# Patient Record
Sex: FEMALE | Race: WHITE | NOT HISPANIC OR LATINO | Employment: OTHER | URBAN - METROPOLITAN AREA
[De-identification: names, ages, dates, MRNs, and addresses within clinical notes are randomized per-mention and may not be internally consistent; named-entity substitution may affect disease eponyms.]

---

## 2023-09-06 PROBLEM — M10.9 GOUT: Status: ACTIVE | Noted: 2023-09-06

## 2023-09-06 PROBLEM — R55 NEAR SYNCOPE: Status: ACTIVE | Noted: 2023-09-06

## 2023-09-06 PROBLEM — Z86.711 HISTORY OF PULMONARY EMBOLISM: Status: ACTIVE | Noted: 2023-09-06

## 2023-09-06 PROBLEM — F32.9 MAJOR DEPRESSIVE DISORDER, SINGLE EPISODE, UNSPECIFIED: Status: ACTIVE | Noted: 2023-09-06

## 2023-09-06 PROBLEM — S72.143A INTERTROCHANTERIC FRACTURE (MULTI): Status: ACTIVE | Noted: 2023-09-06

## 2023-09-06 PROBLEM — M19.90 OSTEOARTHRITIS: Status: ACTIVE | Noted: 2023-09-06

## 2023-09-06 PROBLEM — R07.9 CHEST PAIN: Status: ACTIVE | Noted: 2023-09-06

## 2023-09-06 PROBLEM — D75.839 THROMBOCYTOSIS: Status: ACTIVE | Noted: 2023-09-06

## 2023-09-06 PROBLEM — R00.2 PALPITATIONS: Status: ACTIVE | Noted: 2023-09-06

## 2023-09-06 PROBLEM — R06.00 DYSPNEA: Status: ACTIVE | Noted: 2023-09-06

## 2023-09-06 PROBLEM — I10 HYPERTENSION: Status: ACTIVE | Noted: 2023-09-06

## 2023-09-06 PROBLEM — I82.90 VENOUS THROMBOSIS: Status: ACTIVE | Noted: 2023-09-06

## 2023-09-06 PROBLEM — E03.9 HYPOTHYROIDISM: Status: ACTIVE | Noted: 2023-09-06

## 2023-09-06 PROBLEM — S39.012A BACK STRAIN: Status: ACTIVE | Noted: 2023-09-06
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
## 2023-09-06 PROBLEM — R55 SYNCOPE AND COLLAPSE: Status: ACTIVE | Noted: 2023-09-06

## 2023-09-06 PROBLEM — E78.5 HYPERLIPIDEMIA: Status: ACTIVE | Noted: 2023-09-06

## 2023-09-06 PROBLEM — E55.9 VITAMIN D DEFICIENCY: Status: ACTIVE | Noted: 2023-09-06

## 2023-09-06 PROBLEM — R10.9 ABDOMINAL PAIN: Status: ACTIVE | Noted: 2023-09-06

## 2023-09-06 RX ORDER — LEVOTHYROXINE SODIUM 88 UG/1
88 TABLET ORAL
COMMUNITY
Start: 2017-08-08 | End: 2023-10-11 | Stop reason: HOSPADM

## 2023-09-06 RX ORDER — LISINOPRIL 40 MG/1
40 TABLET ORAL DAILY
COMMUNITY
Start: 2018-04-02 | End: 2023-10-11 | Stop reason: HOSPADM

## 2023-09-06 RX ORDER — METRONIDAZOLE 500 MG/1
500 TABLET ORAL EVERY 8 HOURS
COMMUNITY
End: 2023-10-11 | Stop reason: HOSPADM

## 2023-09-06 RX ORDER — BUPROPION HYDROCHLORIDE 150 MG/1
150 TABLET ORAL EVERY MORNING
COMMUNITY
Start: 2023-01-26 | End: 2023-10-20

## 2023-09-06 RX ORDER — LEVOTHYROXINE SODIUM 112 UG/1
112 TABLET ORAL DAILY
COMMUNITY
End: 2023-10-20

## 2023-09-06 RX ORDER — ALLOPURINOL 100 MG/1
100 TABLET ORAL DAILY
COMMUNITY
Start: 2022-11-14 | End: 2023-10-20 | Stop reason: SDUPTHER

## 2023-09-06 RX ORDER — L. ACIDOPHILUS/L.BULGARICUS 1MM CELL
1 TABLET ORAL
COMMUNITY
End: 2023-10-20

## 2023-09-06 RX ORDER — PRAVASTATIN SODIUM 20 MG/1
20 TABLET ORAL DAILY
COMMUNITY
Start: 2018-12-19 | End: 2023-10-20 | Stop reason: SDUPTHER

## 2023-09-06 RX ORDER — LISINOPRIL 10 MG/1
10 TABLET ORAL DAILY
COMMUNITY
End: 2023-10-13 | Stop reason: SDUPTHER

## 2023-10-09 ENCOUNTER — HOSPITAL ENCOUNTER (OUTPATIENT)
Facility: HOSPITAL | Age: 83
Setting detail: OBSERVATION
LOS: 1 days | Discharge: HOME | DRG: 392 | End: 2023-10-11
Attending: EMERGENCY MEDICINE | Admitting: INTERNAL MEDICINE
Payer: MEDICARE

## 2023-10-09 ENCOUNTER — APPOINTMENT (OUTPATIENT)
Dept: RADIOLOGY | Facility: HOSPITAL | Age: 83
DRG: 392 | End: 2023-10-09
Payer: MEDICARE

## 2023-10-09 DIAGNOSIS — R55 SYNCOPE AND COLLAPSE: ICD-10-CM

## 2023-10-09 DIAGNOSIS — K57.92 DIVERTICULITIS: Primary | ICD-10-CM

## 2023-10-09 LAB
ALBUMIN SERPL-MCNC: 3.8 G/DL (ref 3.5–5)
ALP BLD-CCNC: 94 U/L (ref 35–125)
ALT SERPL-CCNC: 20 U/L (ref 5–40)
ANION GAP SERPL CALC-SCNC: 13 MMOL/L
APPEARANCE UR: ABNORMAL
AST SERPL-CCNC: 19 U/L (ref 5–40)
BACTERIA #/AREA URNS AUTO: ABNORMAL /HPF
BASOPHILS # BLD AUTO: 0.11 X10*3/UL (ref 0–0.1)
BASOPHILS NFR BLD AUTO: 0.5 %
BILIRUB SERPL-MCNC: 0.5 MG/DL (ref 0.1–1.2)
BILIRUB UR STRIP.AUTO-MCNC: NEGATIVE MG/DL
BUN SERPL-MCNC: 20 MG/DL (ref 8–25)
CALCIUM SERPL-MCNC: 9.6 MG/DL (ref 8.5–10.4)
CHLORIDE SERPL-SCNC: 104 MMOL/L (ref 97–107)
CO2 SERPL-SCNC: 22 MMOL/L (ref 24–31)
COLOR UR: YELLOW
CREAT SERPL-MCNC: 1.3 MG/DL (ref 0.4–1.6)
EOSINOPHIL # BLD AUTO: 0.23 X10*3/UL (ref 0–0.4)
EOSINOPHIL NFR BLD AUTO: 1 %
ERYTHROCYTE [DISTWIDTH] IN BLOOD BY AUTOMATED COUNT: 13.2 % (ref 11.5–14.5)
GFR SERPL CREATININE-BSD FRML MDRD: 41 ML/MIN/1.73M*2
GLUCOSE SERPL-MCNC: 170 MG/DL (ref 65–99)
GLUCOSE UR STRIP.AUTO-MCNC: NORMAL MG/DL
HCT VFR BLD AUTO: 47.9 % (ref 36–46)
HGB BLD-MCNC: 15.5 G/DL (ref 12–16)
HYALINE CASTS #/AREA URNS AUTO: ABNORMAL /LPF
IMM GRANULOCYTES # BLD AUTO: 0.13 X10*3/UL (ref 0–0.5)
IMM GRANULOCYTES NFR BLD AUTO: 0.6 % (ref 0–0.9)
KETONES UR STRIP.AUTO-MCNC: NEGATIVE MG/DL
LACTATE BLDV-SCNC: 0.9 MMOL/L (ref 0.4–2)
LACTATE BLDV-SCNC: 2.1 MMOL/L (ref 0.4–2)
LEUKOCYTE ESTERASE UR QL STRIP.AUTO: ABNORMAL
LIPASE SERPL-CCNC: 35 U/L (ref 16–63)
LYMPHOCYTES # BLD AUTO: 1.37 X10*3/UL (ref 0.8–3)
LYMPHOCYTES NFR BLD AUTO: 6.2 %
MCH RBC QN AUTO: 28.9 PG (ref 26–34)
MCHC RBC AUTO-ENTMCNC: 32.4 G/DL (ref 32–36)
MCV RBC AUTO: 89 FL (ref 80–100)
MONOCYTES # BLD AUTO: 0.83 X10*3/UL (ref 0.05–0.8)
MONOCYTES NFR BLD AUTO: 3.8 %
NEUTROPHILS # BLD AUTO: 19.26 X10*3/UL (ref 1.6–5.5)
NEUTROPHILS NFR BLD AUTO: 87.9 %
NITRITE UR QL STRIP.AUTO: NEGATIVE
NRBC BLD-RTO: 0 /100 WBCS (ref 0–0)
PH UR STRIP.AUTO: 5.5 [PH]
PLATELET # BLD AUTO: 442 X10*3/UL (ref 150–450)
PMV BLD AUTO: 10.5 FL (ref 7.5–11.5)
POTASSIUM SERPL-SCNC: 4.8 MMOL/L (ref 3.4–5.1)
PROT SERPL-MCNC: 7 G/DL (ref 5.9–7.9)
PROT UR STRIP.AUTO-MCNC: ABNORMAL MG/DL
RBC # BLD AUTO: 5.37 X10*6/UL (ref 4–5.2)
RBC # UR STRIP.AUTO: ABNORMAL /UL
RBC #/AREA URNS AUTO: ABNORMAL /HPF
SARS-COV-2 RNA RESP QL NAA+PROBE: NOT DETECTED
SODIUM SERPL-SCNC: 139 MMOL/L (ref 133–145)
SP GR UR STRIP.AUTO: 1.03
SQUAMOUS #/AREA URNS AUTO: ABNORMAL /HPF
UROBILINOGEN UR STRIP.AUTO-MCNC: NORMAL MG/DL
WBC # BLD AUTO: 21.9 X10*3/UL (ref 4.4–11.3)
WBC #/AREA URNS AUTO: ABNORMAL /HPF

## 2023-10-09 PROCEDURE — 99285 EMERGENCY DEPT VISIT HI MDM: CPT | Performed by: EMERGENCY MEDICINE

## 2023-10-09 PROCEDURE — 87075 CULTR BACTERIA EXCEPT BLOOD: CPT | Mod: CMCLAB,WESLAB,59 | Performed by: EMERGENCY MEDICINE

## 2023-10-09 PROCEDURE — 87635 SARS-COV-2 COVID-19 AMP PRB: CPT | Performed by: EMERGENCY MEDICINE

## 2023-10-09 PROCEDURE — 85025 COMPLETE CBC W/AUTO DIFF WBC: CPT | Performed by: EMERGENCY MEDICINE

## 2023-10-09 PROCEDURE — 83605 ASSAY OF LACTIC ACID: CPT | Performed by: EMERGENCY MEDICINE

## 2023-10-09 PROCEDURE — 2500000004 HC RX 250 GENERAL PHARMACY W/ HCPCS (ALT 636 FOR OP/ED): Performed by: STUDENT IN AN ORGANIZED HEALTH CARE EDUCATION/TRAINING PROGRAM

## 2023-10-09 PROCEDURE — 1100000001 HC PRIVATE ROOM DAILY

## 2023-10-09 PROCEDURE — 97530 THERAPEUTIC ACTIVITIES: CPT | Mod: GO

## 2023-10-09 PROCEDURE — 80053 COMPREHEN METABOLIC PANEL: CPT | Performed by: EMERGENCY MEDICINE

## 2023-10-09 PROCEDURE — 2500000004 HC RX 250 GENERAL PHARMACY W/ HCPCS (ALT 636 FOR OP/ED): Performed by: INTERNAL MEDICINE

## 2023-10-09 PROCEDURE — 36415 COLL VENOUS BLD VENIPUNCTURE: CPT | Performed by: EMERGENCY MEDICINE

## 2023-10-09 PROCEDURE — 2500000004 HC RX 250 GENERAL PHARMACY W/ HCPCS (ALT 636 FOR OP/ED): Performed by: EMERGENCY MEDICINE

## 2023-10-09 PROCEDURE — 87075 CULTR BACTERIA EXCEPT BLOOD: CPT | Mod: CMCLAB,WESLAB | Performed by: EMERGENCY MEDICINE

## 2023-10-09 PROCEDURE — G0378 HOSPITAL OBSERVATION PER HR: HCPCS

## 2023-10-09 PROCEDURE — 87086 URINE CULTURE/COLONY COUNT: CPT | Mod: WESLAB | Performed by: EMERGENCY MEDICINE

## 2023-10-09 PROCEDURE — 93010 ELECTROCARDIOGRAM REPORT: CPT | Performed by: INTERNAL MEDICINE

## 2023-10-09 PROCEDURE — G1004 CDSM NDSC: HCPCS

## 2023-10-09 PROCEDURE — 83690 ASSAY OF LIPASE: CPT | Performed by: EMERGENCY MEDICINE

## 2023-10-09 PROCEDURE — 97165 OT EVAL LOW COMPLEX 30 MIN: CPT | Mod: GO

## 2023-10-09 PROCEDURE — 2500000001 HC RX 250 WO HCPCS SELF ADMINISTERED DRUGS (ALT 637 FOR MEDICARE OP): Performed by: INTERNAL MEDICINE

## 2023-10-09 PROCEDURE — 87086 URINE CULTURE/COLONY COUNT: CPT | Mod: CMCLAB,WESLAB | Performed by: EMERGENCY MEDICINE

## 2023-10-09 PROCEDURE — 81001 URINALYSIS AUTO W/SCOPE: CPT | Performed by: EMERGENCY MEDICINE

## 2023-10-09 RX ORDER — SODIUM CHLORIDE 9 MG/ML
100 INJECTION, SOLUTION INTRAVENOUS CONTINUOUS
Status: DISCONTINUED | OUTPATIENT
Start: 2023-10-09 | End: 2023-10-10

## 2023-10-09 RX ORDER — ONDANSETRON 4 MG/1
4 TABLET, ORALLY DISINTEGRATING ORAL EVERY 8 HOURS PRN
Status: DISCONTINUED | OUTPATIENT
Start: 2023-10-09 | End: 2023-10-11 | Stop reason: HOSPADM

## 2023-10-09 RX ORDER — ACETAMINOPHEN 160 MG/5ML
650 SOLUTION ORAL EVERY 4 HOURS PRN
Status: DISCONTINUED | OUTPATIENT
Start: 2023-10-09 | End: 2023-10-11 | Stop reason: HOSPADM

## 2023-10-09 RX ORDER — PANTOPRAZOLE SODIUM 40 MG/10ML
40 INJECTION, POWDER, LYOPHILIZED, FOR SOLUTION INTRAVENOUS
Status: DISCONTINUED | OUTPATIENT
Start: 2023-10-09 | End: 2023-10-11 | Stop reason: HOSPADM

## 2023-10-09 RX ORDER — FAMOTIDINE 10 MG/ML
20 INJECTION INTRAVENOUS ONCE
Status: COMPLETED | OUTPATIENT
Start: 2023-10-09 | End: 2023-10-09

## 2023-10-09 RX ORDER — BUPROPION HYDROCHLORIDE 150 MG/1
150 TABLET ORAL EVERY MORNING
Status: DISCONTINUED | OUTPATIENT
Start: 2023-10-09 | End: 2023-10-11 | Stop reason: HOSPADM

## 2023-10-09 RX ORDER — ONDANSETRON HYDROCHLORIDE 2 MG/ML
4 INJECTION, SOLUTION INTRAVENOUS EVERY 8 HOURS PRN
Status: DISCONTINUED | OUTPATIENT
Start: 2023-10-09 | End: 2023-10-11 | Stop reason: HOSPADM

## 2023-10-09 RX ORDER — ALLOPURINOL 100 MG/1
100 TABLET ORAL DAILY
Status: DISCONTINUED | OUTPATIENT
Start: 2023-10-09 | End: 2023-10-11 | Stop reason: HOSPADM

## 2023-10-09 RX ORDER — ENOXAPARIN SODIUM 100 MG/ML
40 INJECTION SUBCUTANEOUS EVERY 24 HOURS
Status: DISCONTINUED | OUTPATIENT
Start: 2023-10-09 | End: 2023-10-11 | Stop reason: HOSPADM

## 2023-10-09 RX ORDER — LEVOTHYROXINE SODIUM 112 UG/1
112 TABLET ORAL DAILY
Status: DISCONTINUED | OUTPATIENT
Start: 2023-10-09 | End: 2023-10-11 | Stop reason: HOSPADM

## 2023-10-09 RX ORDER — ACETAMINOPHEN 500 MG
5 TABLET ORAL NIGHTLY PRN
Status: DISCONTINUED | OUTPATIENT
Start: 2023-10-09 | End: 2023-10-11 | Stop reason: HOSPADM

## 2023-10-09 RX ORDER — ONDANSETRON HYDROCHLORIDE 2 MG/ML
4 INJECTION, SOLUTION INTRAVENOUS ONCE
Status: COMPLETED | OUTPATIENT
Start: 2023-10-09 | End: 2023-10-09

## 2023-10-09 RX ORDER — L. ACIDOPHILUS/L.BULGARICUS 1MM CELL
1 TABLET ORAL
Status: DISCONTINUED | OUTPATIENT
Start: 2023-10-09 | End: 2023-10-11 | Stop reason: HOSPADM

## 2023-10-09 RX ORDER — ACETAMINOPHEN 325 MG/1
650 TABLET ORAL EVERY 4 HOURS PRN
Status: DISCONTINUED | OUTPATIENT
Start: 2023-10-09 | End: 2023-10-11 | Stop reason: HOSPADM

## 2023-10-09 RX ORDER — ACETAMINOPHEN 650 MG/1
650 SUPPOSITORY RECTAL EVERY 4 HOURS PRN
Status: DISCONTINUED | OUTPATIENT
Start: 2023-10-09 | End: 2023-10-11 | Stop reason: HOSPADM

## 2023-10-09 RX ORDER — KETOROLAC TROMETHAMINE 30 MG/ML
15 INJECTION, SOLUTION INTRAMUSCULAR; INTRAVENOUS ONCE
Status: COMPLETED | OUTPATIENT
Start: 2023-10-09 | End: 2023-10-09

## 2023-10-09 RX ORDER — ACETAMINOPHEN 325 MG/1
650 TABLET ORAL ONCE
Status: DISCONTINUED | OUTPATIENT
Start: 2023-10-09 | End: 2023-10-11 | Stop reason: HOSPADM

## 2023-10-09 RX ORDER — PANTOPRAZOLE SODIUM 40 MG/1
40 TABLET, DELAYED RELEASE ORAL
Status: DISCONTINUED | OUTPATIENT
Start: 2023-10-09 | End: 2023-10-11 | Stop reason: HOSPADM

## 2023-10-09 RX ADMIN — SODIUM CHLORIDE 100 ML/HR: 900 INJECTION, SOLUTION INTRAVENOUS at 09:38

## 2023-10-09 RX ADMIN — SODIUM CHLORIDE 500 ML: 900 INJECTION, SOLUTION INTRAVENOUS at 07:59

## 2023-10-09 RX ADMIN — SODIUM CHLORIDE 1000 ML: 900 INJECTION, SOLUTION INTRAVENOUS at 05:39

## 2023-10-09 RX ADMIN — ENOXAPARIN SODIUM 40 MG: 40 INJECTION SUBCUTANEOUS at 09:40

## 2023-10-09 RX ADMIN — PIPERACILLIN SODIUM AND TAZOBACTAM SODIUM 4.5 G: 4; .5 INJECTION, SOLUTION INTRAVENOUS at 05:40

## 2023-10-09 RX ADMIN — KETOROLAC TROMETHAMINE 15 MG: 30 INJECTION, SOLUTION INTRAMUSCULAR; INTRAVENOUS at 05:39

## 2023-10-09 RX ADMIN — PIPERACILLIN SODIUM AND TAZOBACTAM SODIUM 3.38 G: 3; .375 INJECTION, SOLUTION INTRAVENOUS at 18:08

## 2023-10-09 RX ADMIN — SODIUM CHLORIDE 1000 ML: 900 INJECTION, SOLUTION INTRAVENOUS at 08:57

## 2023-10-09 RX ADMIN — PANTOPRAZOLE SODIUM 40 MG: 40 TABLET, DELAYED RELEASE ORAL at 09:40

## 2023-10-09 RX ADMIN — FAMOTIDINE 20 MG: 10 INJECTION INTRAVENOUS at 05:39

## 2023-10-09 RX ADMIN — ALLOPURINOL 100 MG: 100 TABLET ORAL at 13:59

## 2023-10-09 RX ADMIN — ONDANSETRON 4 MG: 2 INJECTION INTRAMUSCULAR; INTRAVENOUS at 05:39

## 2023-10-09 RX ADMIN — PIPERACILLIN SODIUM AND TAZOBACTAM SODIUM 3.38 G: 3; .375 INJECTION, SOLUTION INTRAVENOUS at 11:17

## 2023-10-09 SDOH — HEALTH STABILITY: PHYSICAL HEALTH: ON AVERAGE, HOW MANY MINUTES DO YOU ENGAGE IN EXERCISE AT THIS LEVEL?: 0 MIN

## 2023-10-09 SDOH — SOCIAL STABILITY: SOCIAL INSECURITY
WITHIN THE LAST YEAR, HAVE YOU BEEN KICKED, HIT, SLAPPED, OR OTHERWISE PHYSICALLY HURT BY YOUR PARTNER OR EX-PARTNER?: NO

## 2023-10-09 SDOH — SOCIAL STABILITY: SOCIAL INSECURITY: HAS ANYONE EVER THREATENED TO HURT YOUR FAMILY OR YOUR PETS?: NO

## 2023-10-09 SDOH — ECONOMIC STABILITY: INCOME INSECURITY: IN THE LAST 12 MONTHS, WAS THERE A TIME WHEN YOU WERE NOT ABLE TO PAY THE MORTGAGE OR RENT ON TIME?: NO

## 2023-10-09 SDOH — SOCIAL STABILITY: SOCIAL NETWORK
IN A TYPICAL WEEK, HOW MANY TIMES DO YOU TALK ON THE PHONE WITH FAMILY, FRIENDS, OR NEIGHBORS?: MORE THAN THREE TIMES A WEEK

## 2023-10-09 SDOH — ECONOMIC STABILITY: FOOD INSECURITY: WITHIN THE PAST 12 MONTHS, YOU WORRIED THAT YOUR FOOD WOULD RUN OUT BEFORE YOU GOT MONEY TO BUY MORE.: NEVER TRUE

## 2023-10-09 SDOH — HEALTH STABILITY: MENTAL HEALTH: HOW MANY STANDARD DRINKS CONTAINING ALCOHOL DO YOU HAVE ON A TYPICAL DAY?: 1 OR 2

## 2023-10-09 SDOH — HEALTH STABILITY: MENTAL HEALTH: HOW OFTEN DO YOU HAVE 6 OR MORE DRINKS ON ONE OCCASION?: NEVER

## 2023-10-09 SDOH — ECONOMIC STABILITY: HOUSING INSECURITY
IN THE LAST 12 MONTHS, WAS THERE A TIME WHEN YOU DID NOT HAVE A STEADY PLACE TO SLEEP OR SLEPT IN A SHELTER (INCLUDING NOW)?: NO

## 2023-10-09 SDOH — HEALTH STABILITY: MENTAL HEALTH
STRESS IS WHEN SOMEONE FEELS TENSE, NERVOUS, ANXIOUS, OR CAN'T SLEEP AT NIGHT BECAUSE THEIR MIND IS TROUBLED. HOW STRESSED ARE YOU?: ONLY A LITTLE

## 2023-10-09 SDOH — SOCIAL STABILITY: SOCIAL NETWORK
DO YOU BELONG TO ANY CLUBS OR ORGANIZATIONS SUCH AS CHURCH GROUPS UNIONS, FRATERNAL OR ATHLETIC GROUPS, OR SCHOOL GROUPS?: NO

## 2023-10-09 SDOH — SOCIAL STABILITY: SOCIAL INSECURITY: WITHIN THE LAST YEAR, HAVE YOU BEEN HUMILIATED OR EMOTIONALLY ABUSED IN OTHER WAYS BY YOUR PARTNER OR EX-PARTNER?: NO

## 2023-10-09 SDOH — SOCIAL STABILITY: SOCIAL INSECURITY
WITHIN THE LAST YEAR, HAVE TO BEEN RAPED OR FORCED TO HAVE ANY KIND OF SEXUAL ACTIVITY BY YOUR PARTNER OR EX-PARTNER?: NO

## 2023-10-09 SDOH — SOCIAL STABILITY: SOCIAL INSECURITY: WERE YOU ABLE TO COMPLETE ALL THE BEHAVIORAL HEALTH SCREENINGS?: YES

## 2023-10-09 SDOH — ECONOMIC STABILITY: TRANSPORTATION INSECURITY
IN THE PAST 12 MONTHS, HAS LACK OF TRANSPORTATION KEPT YOU FROM MEETINGS, WORK, OR FROM GETTING THINGS NEEDED FOR DAILY LIVING?: NO

## 2023-10-09 SDOH — SOCIAL STABILITY: SOCIAL INSECURITY: DOES ANYONE TRY TO KEEP YOU FROM HAVING/CONTACTING OTHER FRIENDS OR DOING THINGS OUTSIDE YOUR HOME?: NO

## 2023-10-09 SDOH — ECONOMIC STABILITY: INCOME INSECURITY: IN THE PAST 12 MONTHS, HAS THE ELECTRIC, GAS, OIL, OR WATER COMPANY THREATENED TO SHUT OFF SERVICE IN YOUR HOME?: NO

## 2023-10-09 SDOH — ECONOMIC STABILITY: FOOD INSECURITY: WITHIN THE PAST 12 MONTHS, THE FOOD YOU BOUGHT JUST DIDN'T LAST AND YOU DIDN'T HAVE MONEY TO GET MORE.: NEVER TRUE

## 2023-10-09 SDOH — HEALTH STABILITY: MENTAL HEALTH: HOW OFTEN DO YOU HAVE A DRINK CONTAINING ALCOHOL?: MONTHLY OR LESS

## 2023-10-09 SDOH — HEALTH STABILITY: PHYSICAL HEALTH: ON AVERAGE, HOW MANY DAYS PER WEEK DO YOU ENGAGE IN MODERATE TO STRENUOUS EXERCISE (LIKE A BRISK WALK)?: 0 DAYS

## 2023-10-09 SDOH — ECONOMIC STABILITY: HOUSING INSECURITY: IN THE LAST 12 MONTHS, HOW MANY PLACES HAVE YOU LIVED?: 1

## 2023-10-09 SDOH — SOCIAL STABILITY: SOCIAL NETWORK: HOW OFTEN DO YOU GET TOGETHER WITH FRIENDS OR RELATIVES?: MORE THAN THREE TIMES A WEEK

## 2023-10-09 SDOH — SOCIAL STABILITY: SOCIAL INSECURITY: HAVE YOU HAD THOUGHTS OF HARMING ANYONE ELSE?: NO

## 2023-10-09 SDOH — SOCIAL STABILITY: SOCIAL NETWORK: HOW OFTEN DO YOU ATTEND CHURCH OR RELIGIOUS SERVICES?: NEVER

## 2023-10-09 SDOH — SOCIAL STABILITY: SOCIAL INSECURITY: ARE YOU OR HAVE YOU BEEN THREATENED OR ABUSED PHYSICALLY, EMOTIONALLY, OR SEXUALLY BY ANYONE?: NO

## 2023-10-09 SDOH — ECONOMIC STABILITY: INCOME INSECURITY: HOW HARD IS IT FOR YOU TO PAY FOR THE VERY BASICS LIKE FOOD, HOUSING, MEDICAL CARE, AND HEATING?: NOT HARD AT ALL

## 2023-10-09 SDOH — ECONOMIC STABILITY: TRANSPORTATION INSECURITY
IN THE PAST 12 MONTHS, HAS THE LACK OF TRANSPORTATION KEPT YOU FROM MEDICAL APPOINTMENTS OR FROM GETTING MEDICATIONS?: NO

## 2023-10-09 SDOH — SOCIAL STABILITY: SOCIAL NETWORK: HOW OFTEN DO YOU GET TOGETHER WITH FRIENDS OR RELATIVES?: THREE TIMES A WEEK

## 2023-10-09 SDOH — SOCIAL STABILITY: SOCIAL INSECURITY: DO YOU FEEL UNSAFE GOING BACK TO THE PLACE WHERE YOU ARE LIVING?: NO

## 2023-10-09 SDOH — HEALTH STABILITY: MENTAL HEALTH
STRESS IS WHEN SOMEONE FEELS TENSE, NERVOUS, ANXIOUS, OR CAN'T SLEEP AT NIGHT BECAUSE THEIR MIND IS TROUBLED. HOW STRESSED ARE YOU?: NOT AT ALL

## 2023-10-09 SDOH — SOCIAL STABILITY: SOCIAL NETWORK: HOW OFTEN DO YOU ATTENT MEETINGS OF THE CLUB OR ORGANIZATION YOU BELONG TO?: NEVER

## 2023-10-09 SDOH — SOCIAL STABILITY: SOCIAL INSECURITY: ARE THERE ANY APPARENT SIGNS OF INJURIES/BEHAVIORS THAT COULD BE RELATED TO ABUSE/NEGLECT?: NO

## 2023-10-09 SDOH — SOCIAL STABILITY: SOCIAL INSECURITY: WITHIN THE LAST YEAR, HAVE YOU BEEN AFRAID OF YOUR PARTNER OR EX-PARTNER?: NO

## 2023-10-09 SDOH — SOCIAL STABILITY: SOCIAL INSECURITY: DO YOU FEEL ANYONE HAS EXPLOITED OR TAKEN ADVANTAGE OF YOU FINANCIALLY OR OF YOUR PERSONAL PROPERTY?: NO

## 2023-10-09 SDOH — SOCIAL STABILITY: SOCIAL INSECURITY: ABUSE: ADULT

## 2023-10-09 ASSESSMENT — ACTIVITIES OF DAILY LIVING (ADL)
GROOMING: INDEPENDENT
BATHING: INDEPENDENT
BATHING_ASSISTANCE: MINIMAL
FEEDING YOURSELF: INDEPENDENT
ADEQUATE_TO_COMPLETE_ADL: YES
ADEQUATE_TO_COMPLETE_ADL: YES
FEEDING YOURSELF: INDEPENDENT
WALKS IN HOME: INDEPENDENT
HEARING - RIGHT EAR: HEARING AID
TOILETING: INDEPENDENT
DRESSING YOURSELF: INDEPENDENT
HEARING - LEFT EAR: HEARING AID
PATIENT'S MEMORY ADEQUATE TO SAFELY COMPLETE DAILY ACTIVITIES?: YES
TOILETING: INDEPENDENT
WALKS IN HOME: INDEPENDENT
DRESSING YOURSELF: INDEPENDENT
BATHING: INDEPENDENT
HEARING - RIGHT EAR: HEARING AID
ADL_ASSISTANCE: INDEPENDENT
JUDGMENT_ADEQUATE_SAFELY_COMPLETE_DAILY_ACTIVITIES: YES
HEARING - LEFT EAR: HEARING AID
GROOMING: INDEPENDENT
JUDGMENT_ADEQUATE_SAFELY_COMPLETE_DAILY_ACTIVITIES: YES
PATIENT'S MEMORY ADEQUATE TO SAFELY COMPLETE DAILY ACTIVITIES?: YES

## 2023-10-09 ASSESSMENT — COGNITIVE AND FUNCTIONAL STATUS - GENERAL
DAILY ACTIVITIY SCORE: 20
MOBILITY SCORE: 24
HELP NEEDED FOR BATHING: A LITTLE
MOBILITY SCORE: 24
DRESSING REGULAR LOWER BODY CLOTHING: A LITTLE
DAILY ACTIVITIY SCORE: 20
TOILETING: A LITTLE
PATIENT BASELINE BEDBOUND: NO
PERSONAL GROOMING: A LITTLE
DAILY ACTIVITIY SCORE: 24
PERSONAL GROOMING: A LITTLE
HELP NEEDED FOR BATHING: A LITTLE
DRESSING REGULAR LOWER BODY CLOTHING: A LITTLE
TOILETING: A LITTLE

## 2023-10-09 ASSESSMENT — PAIN SCALES - GENERAL
PAINLEVEL_OUTOF10: 0 - NO PAIN
PAINLEVEL_OUTOF10: 1
PAINLEVEL_OUTOF10: 0 - NO PAIN
PAINLEVEL_OUTOF10: 0 - NO PAIN

## 2023-10-09 ASSESSMENT — ENCOUNTER SYMPTOMS
CARDIOVASCULAR NEGATIVE: 1
PSYCHIATRIC NEGATIVE: 1
HEMATOLOGIC/LYMPHATIC NEGATIVE: 1
RESPIRATORY NEGATIVE: 1
DIARRHEA: 1
ABDOMINAL PAIN: 1
ALLERGIC/IMMUNOLOGIC NEGATIVE: 1
MUSCULOSKELETAL NEGATIVE: 1
NEUROLOGICAL NEGATIVE: 1
EYES NEGATIVE: 1
CONSTITUTIONAL NEGATIVE: 1

## 2023-10-09 ASSESSMENT — PAIN - FUNCTIONAL ASSESSMENT
PAIN_FUNCTIONAL_ASSESSMENT: 0-10

## 2023-10-09 ASSESSMENT — LIFESTYLE VARIABLES
AUDIT-C TOTAL SCORE: 1
HAVE YOU EVER FELT YOU SHOULD CUT DOWN ON YOUR DRINKING: NO
HOW MANY STANDARD DRINKS CONTAINING ALCOHOL DO YOU HAVE ON A TYPICAL DAY: 1 OR 2
HOW OFTEN DO YOU HAVE 6 OR MORE DRINKS ON ONE OCCASION: NEVER
HAVE PEOPLE ANNOYED YOU BY CRITICIZING YOUR DRINKING: NO
SKIP TO QUESTIONS 9-10: 1
AUDIT-C TOTAL SCORE: 1
HOW OFTEN DO YOU HAVE A DRINK CONTAINING ALCOHOL: MONTHLY OR LESS
EVER HAD A DRINK FIRST THING IN THE MORNING TO STEADY YOUR NERVES TO GET RID OF A HANGOVER: NO
AUDIT-C TOTAL SCORE: 1
EVER FELT BAD OR GUILTY ABOUT YOUR DRINKING: NO
SKIP TO QUESTIONS 9-10: 1

## 2023-10-09 ASSESSMENT — PAIN SCALES - PAIN ASSESSMENT IN ADVANCED DEMENTIA (PAINAD)
BODYLANGUAGE: RELAXED
BREATHING: NORMAL
BODYLANGUAGE: RELAXED
BREATHING: NORMAL

## 2023-10-09 ASSESSMENT — PATIENT HEALTH QUESTIONNAIRE - PHQ9
SUM OF ALL RESPONSES TO PHQ9 QUESTIONS 1 & 2: 0
1. LITTLE INTEREST OR PLEASURE IN DOING THINGS: NOT AT ALL
2. FEELING DOWN, DEPRESSED OR HOPELESS: NOT AT ALL

## 2023-10-09 ASSESSMENT — COLUMBIA-SUICIDE SEVERITY RATING SCALE - C-SSRS
2. HAVE YOU ACTUALLY HAD ANY THOUGHTS OF KILLING YOURSELF?: NO
6. HAVE YOU EVER DONE ANYTHING, STARTED TO DO ANYTHING, OR PREPARED TO DO ANYTHING TO END YOUR LIFE?: NO
1. IN THE PAST MONTH, HAVE YOU WISHED YOU WERE DEAD OR WISHED YOU COULD GO TO SLEEP AND NOT WAKE UP?: NO

## 2023-10-09 NOTE — PROGRESS NOTES
Occupational Therapy    Evaluation/Treatment    Patient Name: Marzena Robb  MRN: [id number]  : 1940  Today's Date: 10/09/23  Time Calculation  Start Time:   Stop Time: 1340  Time Calculation (min): 30 min       Assessment:  Prognosis: Good  Barriers to Discharge: None  Evaluation/Treatment Tolerance: Patient tolerated treatment well  Medical Staff Made Aware: Yes  OT Assessment Results: Decreased ADL status, Decreased upper extremity range of motion, Decreased endurance, Decreased functional mobility, Decreased IADLs  Prognosis: Good  Barriers to Discharge: None  Evaluation/Treatment Tolerance: Patient tolerated treatment well  Medical Staff Made Aware: Yes    Plan:  Treatment Interventions: ADL retraining, Functional transfer training, Endurance training, Patient/family training, Equipment evaluation/education  OT Frequency: 3 times per week  OT Discharge Recommendations: Low intensity level of continued care  Treatment Interventions: ADL retraining, Functional transfer training, Endurance training, Patient/family training, Equipment evaluation/education    Subjective     General:   OT Received On: 10/09/23  General  Reason for Referral: Diverticulitis, abd pain, diarrhea  Referred By: Dr. Cain  Past Medical History Relevant to Rehab: PE, OA, major depressive disorder, back strain  Family/Caregiver Present: No  Prior to Session Communication: Bedside nurse  Patient Position Received: On cart  General Comment: Pt supine in bed on an ED cart and agreeable to OT eval.    Precautions:  Medical Precautions: Fall precautions, Infection precautions (r/o c-diff)    Vital Signs:  Heart Rate: 103  SpO2: 95 %  BP: (!) 120/49    Pain:  Pain Assessment  Pain Assessment: 0-10  Pain Score: 0 - No pain    Objective   Cognition:  Overall Cognitive Status: Within Functional Limits  Arousal/Alertness: Appropriate responses to stimuli  Orientation Level: Oriented X4  Safety Judgment: Good awareness of safety  precautions  Cognition Comments: Tribe           Home Living:  Type of Home: House  Lives With: Alone  Home Adaptive Equipment: Cane  Home Layout: One level, Laundry main level, Full bath main level  Home Access: Level entry (1 step only)  Bathroom Shower/Tub: Walk-in shower  Bathroom Toilet: Standard  Bathroom Equipment: Grab bars in shower    Prior Function:  Level of [place]: Independent with ADLs and functional transfers, Independent with homemaking with ambulation  ADL Assistance: Independent  Homemaking Assistance: Independent  Ambulatory Assistance: Independent  Vocational: Retired  Hand Dominance: Left    IADL History:  Homemaking Responsibilities: Yes  Meal Prep Responsibility: Primary  Laundry Responsibility: Primary  Cleaning Responsibility: Primary  Bill Paying/Finance Responsibility: Primary  Shopping Responsibility: Primary  Current License: Yes  Mode of Transportation: Car  Occupation: Retired  IADL Comments: Family/friends close by if assist needed.    ADL:  Eating Assistance: Independent  Grooming Assistance: Stand by  Grooming Deficit: Supervision/safety  Bathing Assistance: Minimal  Bathing Deficit: Buttocks (pt with dry BM from an accident at home needing some assist using a wet washcloth to clean area)  UE Dressing Assistance: Stand by  UE Dressing Deficit: Setup  LE Dressing Assistance: Minimal  LE Dressing Deficit: Don/doff R sock, Don/doff L sock  Toileting Assistance with Device: Unable to assess  ADL Comments: Pt appears to be at a min A to close S/setup level for most functional tasks on eval.                                  Activity Tolerance:  Activity Tolerance Comments: impaired     Bed Mobility/Transfers: Bed Mobility  Bed Mobility: Yes  Bed Mobility 1  Bed Mobility 1: Supine to sitting, Sitting to supine  Level of Assistance 1: Close supervision    Transfers  Transfer: Yes  Transfer 1  Transfer From 1: Bed to, Stand to  Transfer to 1: Sit, Stand  Technique 1: Sit to stand, Stand  to sit  Transfer Level of Assistance 1: Close supervision  Trials/Comments 1: supervision for safety         Vision:Vision - Basic Assessment  Current Vision: No visual deficits  Sensation:  Sensation Comment: WFL  Strength:  Strength Comments: RUE AROM WFL, MMT WFL, LUE AROM impaired at shoulder, but WFL distally, MMT shoulder impaired, but WFL distally        Hand Function:  Hand Function  Gross Grasp: Functional  Coordination: Functional           Outcome Measures: [facility] Daily Activity  Putting on and taking off regular lower body clothing: A little  Bathing (including washing, rinsing, drying): A little  Putting on and taking off regular upper body clothing: None  Toileting, which includes using toilet, bedpan or urinal: A little  Taking care of personal grooming such as brushing teeth: A little  Eating Meals: None  Daily Activity - Total Score: 20        Education Documentation  ADL Training, taught by Bakari Davey Jr., OT at 10/9/2023  2:14 PM.  Learner: Patient  Readiness: Acceptance  Method: Explanation, Demonstration  Response: Verbalizes Understanding, Demonstrated Understanding    Education Comments  No comments found.             Goals:  Encounter Problems       Encounter Problems (Active)       OT Goals       OT Goal 1 (Progressing)       Start:  10/09/23    Expected End:  10/23/23       Pt will complete all functional transfers and mobility with independence.         OT Goal 2 (Progressing)       Start:  10/09/23    Expected End:  10/23/23       Pt will complete LB dressing with independence.         OT Goal 3 (Progressing)       Start:  10/09/23    Expected End:  10/23/23       Pt will complete all toileting tasks with independence.         OT Goal 4 (Progressing)       Start:  10/09/23    Expected End:  10/23/23       Pt will complete all grooming tasks with independence in standing.         OT Goal 5 (Progressing)       Start:  10/09/23    Expected End:  10/23/23       Pt will complete homemaking  tasks and household distance functional mobility with independence.

## 2023-10-09 NOTE — CARE PLAN
Problem: OT Goals  Goal: OT Goal 1  Description: Pt will complete all functional transfers and mobility with independence.  Outcome: Progressing  Goal: OT Goal 2  Description: Pt will complete LB dressing with independence.  Outcome: Progressing  Goal: OT Goal 3  Description: Pt will complete all toileting tasks with independence.  Outcome: Progressing  Goal: OT Goal 4  Description: Pt will complete all grooming tasks with independence in standing.  Outcome: Progressing  Goal: OT Goal 5  Description: Pt will complete homemaking tasks and household distance functional mobility with independence.  Outcome: Progressing

## 2023-10-09 NOTE — H&P
History Of Present Illness  Marzena Robb is a 83 y.o. female presenting with abdominal pain.  Patient was her normal state of health going to sleep last night, waking up middle the night with sharp bandlike abdominal pain in the right and left lower quadrants.  This is associated with nonbloody diarrhea.  No emesis.  No fevers or chills.  She had an episode similar to this in 2020 thought to be infectious..     Past Medical History  She has no past medical history on file.  Pulmonary embolism not on anticoagulation  Hypothyroidism  Hypertension  [place] hernia  Surgical History  She has no past surgical history on file.  None  Social History  She has no history on file for tobacco use, alcohol use, and drug use.  No tobacco  No alcohol  No drug  Family History  Family History   Problem Relation Name Age of Onset    No Known Problems Mother      Cancer Father          Allergies  Codeine, Hydromorphone, and Ciprofloxacin    Review of Systems   Gastrointestinal:  Positive for abdominal pain and diarrhea.   All other systems reviewed and are negative.       Physical Exam  Vitals reviewed.   HENT:      Nose: Nose normal.      Mouth/Throat:      Mouth: Mucous membranes are dry.   Eyes:      Extraocular Movements: Extraocular movements intact.   Cardiovascular:      Rate and Rhythm: Regular rhythm.   Pulmonary:      Effort: Pulmonary effort is normal.      Breath sounds: Normal breath sounds.   Abdominal:      Tenderness: There is abdominal tenderness. There is guarding. There is no rebound.   Musculoskeletal:         General: Normal range of motion.   Skin:     General: Skin is warm.   Neurological:      General: No focal deficit present.      Mental Status: She is alert.   Psychiatric:         Mood and Affect: Mood normal.          Last Recorded Vitals  BP (!) 121/45   Pulse 98   Temp 36.5 °C (97.7 °F)   Resp 17   Wt 72.6 kg (160 lb)   SpO2 (!) 89%     Relevant Results        Results for orders placed or performed  during the hospital encounter of 10/09/23 (from the past 24 hour(s))   CBC and Auto Differential   Result Value Ref Range    WBC 21.9 (H) 4.4 - 11.3 x10*3/uL    nRBC 0.0 0.0 - 0.0 /100 WBCs    RBC 5.37 (H) 4.00 - 5.20 x10*6/uL    Hemoglobin 15.5 12.0 - 16.0 g/dL    Hematocrit 47.9 (H) 36.0 - 46.0 %    MCV 89 80 - 100 fL    MCH 28.9 26.0 - 34.0 pg    MCHC 32.4 32.0 - 36.0 g/dL    RDW 13.2 11.5 - 14.5 %    Platelets 442 150 - 450 x10*3/uL    MPV 10.5 7.5 - 11.5 fL    Neutrophils % 87.9 40.0 - 80.0 %    Immature Granulocytes %, Automated 0.6 0.0 - 0.9 %    Lymphocytes % 6.2 13.0 - 44.0 %    Monocytes % 3.8 2.0 - 10.0 %    Eosinophils % 1.0 0.0 - 6.0 %    Basophils % 0.5 0.0 - 2.0 %    Neutrophils Absolute 19.26 (H) 1.60 - 5.50 x10*3/uL    Immature Granulocytes Absolute, Automated 0.13 0.00 - 0.50 x10*3/uL    Lymphocytes Absolute 1.37 0.80 - 3.00 x10*3/uL    Monocytes Absolute 0.83 (H) 0.05 - 0.80 x10*3/uL    Eosinophils Absolute 0.23 0.00 - 0.40 x10*3/uL    Basophils Absolute 0.11 (H) 0.00 - 0.10 x10*3/uL   Comprehensive metabolic panel   Result Value Ref Range    Glucose 170 (H) 65 - 99 mg/dL    Sodium 139 133 - 145 mmol/L    Potassium 4.8 3.4 - 5.1 mmol/L    Chloride 104 97 - 107 mmol/L    Bicarbonate 22 (L) 24 - 31 mmol/L    Urea Nitrogen 20 8 - 25 mg/dL    Creatinine 1.30 0.40 - 1.60 mg/dL    eGFR 41 (L) >60 mL/min/1.73m*2    Calcium 9.6 8.5 - 10.4 mg/dL    Albumin 3.8 3.5 - 5.0 g/dL    Alkaline Phosphatase 94 35 - 125 U/L    Total Protein 7.0 5.9 - 7.9 g/dL    AST 19 5 - 40 U/L    Bilirubin, Total 0.5 0.1 - 1.2 mg/dL    ALT 20 5 - 40 U/L    Anion Gap 13 <=19 mmol/L   Lipase   Result Value Ref Range    Lipase 35 16 - 63 U/L   BLOOD GAS LACTIC ACID, VENOUS   Result Value Ref Range    POCT Lactate, Venous 2.1 (H) 0.4 - 2.0 mmol/L   Blood Gas Lactic Acid, Venous   Result Value Ref Range    POCT Lactate, Venous 0.9 0.4 - 2.0 mmol/L         Assessment/Plan   Principal Problem:    Diverticulitis      Sepsis  -Source is  abdominal infection, leukocytosis, tachycardia  -Lactate is only modestly elevated.  Will give another liter bolus  -Follow blood cultures  -Zosyn  -Gastroenterology consult.    Hypotension  -Mostly elevated lactate.  Believe patient is still volume depleted will give another liter normal saline.  -Normal saline 100 mL/h.    Hypothyroidism  -Continue home levothyroxine.    Hypertension  -As above.  Obviously will need to hold antihypertensives.    Physical therapy occupational therapy       Devang Cain, DO

## 2023-10-09 NOTE — ED PROVIDER NOTES
"HPI   Chief Complaint   Patient presents with   • Syncope     Pt presents to ED with syncope after loose BM, pt states that she woke up with the urge to have a BM, pt endorses large amount of soft stool then LOC, pt states the same thing happened 2 years ago and was told it was an \"idiopathic infection\", pt denying any more abdominal pain after BM       83-year-old female with a history of hypertension and dyslipidemia is brought to the emergency department by EMS with a chief complaint of passing out.  The patient woke up in the middle of the night with an urge to defecate and severe cramping abdominal pain.  She states that she had a large soft stool, became dizzy and had loss of consciousness.  She did not hit her head.  She states the same thing happened 2 years ago and told she had an idiopathic infection.  She states that after the bowel movement her pain has improved significantly.  She has a history of an umbilical hernia that is tender to palpation and asks that I do not press too hard.  She denies any intra-abdominal surgeries.      History provided by:  Patient and EMS personnel   used: No                        No data recorded                Patient History   No past medical history on file.  No past surgical history on file.  Family History   Problem Relation Name Age of Onset   • No Known Problems Mother     • Cancer Father       Social History     Tobacco Use   • Smoking status: Not on file   • Smokeless tobacco: Not on file   Substance Use Topics   • Alcohol use: Not on file   • Drug use: Not on file       Physical Exam   ED Triage Vitals [10/09/23 0505]   Temp Heart Rate Resp BP   36.5 °C (97.7 °F) 86 16 87/75      SpO2 Temp Source Heart Rate Source Patient Position   -- Temporal Monitor Lying      BP Location FiO2 (%)     Left arm --       Physical Exam  Vitals and nursing note reviewed.   Constitutional:       General: She is not in acute distress.     Appearance: Normal " appearance. She is well-developed. She is obese. She is ill-appearing. She is not toxic-appearing or diaphoretic.   HENT:      Head: Normocephalic and atraumatic.      Mouth/Throat:      Mouth: Mucous membranes are dry.   Eyes:      General: No scleral icterus.     Conjunctiva/sclera: Conjunctivae normal.   Cardiovascular:      Rate and Rhythm: Regular rhythm. Tachycardia present.      Heart sounds: No murmur heard.  Pulmonary:      Effort: Pulmonary effort is normal. No respiratory distress.      Breath sounds: Normal breath sounds.   Abdominal:      General: Bowel sounds are normal. There is no distension.      Palpations: Abdomen is soft.      Tenderness: There is no abdominal tenderness. There is no guarding or rebound.      Hernia: A hernia is present.   Musculoskeletal:         General: No swelling or deformity.      Cervical back: Neck supple.   Skin:     General: Skin is warm and dry.      Capillary Refill: Capillary refill takes less than 2 seconds.   Neurological:      General: No focal deficit present.      Mental Status: She is alert.   Psychiatric:         Mood and Affect: Mood normal.         Behavior: Behavior normal.       ED Course & MDM   ED Course as of 10/13/23 1257   Mon Oct 09, 2023   0528 ECG performed on 10/9/2023 at 5:06 AM shows a sinus tachycardia with a ventricular rate of 104, left axis deviation, otherwise intervals within normal limits, no STEMI.  No previous for comparison. [EG]      ED Course User Index  [EG] Angelika Gottlieb MD         Diagnoses as of 10/13/23 1257   Diverticulitis   Syncope and collapse       Medical Decision Making    HPI:  As Above  PMHx/PSHx/Meds/Allergies/SH/FH as per nursing documentation and reviewed.  Review of systems: Total of 10 systems reviewed and otherwise negative except as noted elsewhere    DDX: As described in MDM    If performed, radiology listed above interpreted by me and confirmed by the Radiologist.  Medications administered during  this visit (name and route): see MAR  Social determinants of health considered for this visit: lives alone  If performed, EKG interpreted by me and detailed above    MDM Summary/considerations:    Sepsis secondary to diverticulitiis  Pt treated conservatively with IV fluids as potential for pulmonary edema. Given Zosyn as early abx. Improving lactic acid and vital signs. Hypotension likely from GI losses.. Blood and urine cultures pending. Nausea much improved with zofran. Pt will be admitted to hospitalist service.    Syncopal episode likely due to hypovolemia than cardiac arrhythmia or CAD.         The patient was seen and triaged by our nursing/medic staff, their vitals were taken and the staff notes were reviewed.  If the patient arrived by an EMS squad or an outside agency, we discussed the case with transporting EMS medic, police, or other historians. My initial assessment was attention to their airway, breathing, and circulatory status.  We addressed any immediate or life threatening findings and completed a medical history and a physical exam if the patient or those legally responsible were in agreement with this.   Prior to the patient being discharged, I or my PA/NP or the nursing staff discussed the differential, results and discharge plan with the patient and/or family/friend/caregiver if present.  I emphasized the importance of follow-up in 2-3 days unless otherwise specified.  I explained reasons for the patient to return to the Emergency Department. Additional verbal discharge instructions were also given and discussed with the patient to supplement those generated by the EMR. We also discussed medications that were prescribed (if any) including common side effects and interactions. The patient was advised to abstain from driving, operating heavy machinery or making significant decisions while taking medications such as antihistamines, benzodiazepines, opiates and muscle relaxers. All questions were  addressed.  They understand return precautions and discharge instructions. The patient and/or family/friend/caregiver expressed understanding.  **Disclaimer:  This note was dictated by speech recognition technology.  Minor errors in transcription may be present.  Please contact for clarification or corrections.    In the case the patient eloped or refused treatment/admission, we offered to the best of our ability to provide care to the patient at the time of this encounter.    Amount and/or Complexity of Data Reviewed  Discussion of management or test interpretation with external provider(s): 31 minutes of critical care time were spent on re-examinations, close monitoring, data analysis and communication with patient/family/other providers and do not include time spent on procedures. Not concurrent with other providers        Procedure  Procedures     Angelika Gottlieb MD  10/13/23 0156

## 2023-10-09 NOTE — PROGRESS NOTES
Patient was initially seen by my colleague and endorsed to me at [facility].    Briefly, patient is an 83-year-old female that who presented to the emergency department for evaluation of abdominal pain, syncopal episode.  Patient was initially hypotensive on initial presentation and had abdominal discomfort and blood work was ordered along with CT scan of the abdomen pelvis.  Blood work had resulted showing a significant leukocytosis of 21.9, slightly elevated lactic acid of 2.1.  Patient's blood pressure did improve with IV fluids up to 100 systolic.  Patient was given IV antibiotics and signed out to me pending CT of the abdomen pelvis results.  There are nonspecific inflammatory changes surrounding a segment of the mid sigmoid colon with diverticula which could be diverticulitis but also could be an enteritis as there is also some changes around the small bowel as well.  Given her initial hypotension, blood work and imaging findings, patient will be admitted for further IV antibiotics, IV fluids.  Discussed case with hospitalist who accepted patient for admission.

## 2023-10-09 NOTE — ED NOTES
Pt has not has not had a BM the entire shift. Unable to obtain a stool sample.     Vivi Zepeda RN  10/09/23 9269

## 2023-10-09 NOTE — CONSULTS
Inpatient consult to gastroenterology  Consult performed by: Letiica Mejia, APRN-CNP  Consult ordered by: Devang Cain DO          Reason For Consult  Diverticulitis    History Of Present Illness  Marzena Robb is a 83 y.o. female presenting with acute episode on diarrhea last night. She stated she felt 8/10 pain in her lower abd prior to episode of diarrhea. She reports being constipated on/off for the last month. No hx of diverticulosis, no prior EGD/COLON . She denies any more episodes of diarrhea since admission, no melena, hematochezia, nausea, vomiting, fever, chills. Overall her symptoms have improved today. No recent travel/antibiotic use.     CT findings Generalized wall thickening and perienteric fat stranding about  the distal jejunum/terminal ileum with component of at least moderate  stranding of the perienteric fat. descending and sigmoid diverticulosis with short segment wall thickening of the mid sigmoid colon may reflect sequela of chronic diverticulitis.    Elevated WBC 21.9     Past Medical History  She has a past medical history of Disease of thyroid gland and Hypertension.    Surgical History  She has a past surgical history that includes Fracture surgery.     Social History  She reports that she has never smoked. She has never used smokeless tobacco. She reports that she does not currently use alcohol. She reports that she does not use drugs.    Family History  Family History   Problem Relation Name Age of Onset    No Known Problems Mother      Cancer Father          Allergies  Codeine, Hydromorphone, and Ciprofloxacin    Review of Systems   Constitutional: Negative.    HENT: Negative.     Eyes: Negative.    Respiratory: Negative.     Cardiovascular: Negative.    Gastrointestinal:  Positive for abdominal pain and diarrhea.   Genitourinary: Negative.    Musculoskeletal: Negative.    Skin: Negative.    Allergic/Immunologic: Negative.    Neurological: Negative.    Hematological:  "Negative.    Psychiatric/Behavioral: Negative.          Physical Exam  HENT:      Head: Normocephalic.      Nose: Nose normal.      Mouth/Throat:      Mouth: Mucous membranes are moist.   Eyes:      Pupils: Pupils are equal, round, and reactive to light.   Cardiovascular:      Pulses: Normal pulses.   Pulmonary:      Effort: Pulmonary effort is normal.   Abdominal:      General: Abdomen is flat.   Musculoskeletal:         General: Normal range of motion.      Cervical back: Normal range of motion.   Skin:     General: Skin is warm.   Neurological:      General: No focal deficit present.      Mental Status: She is alert.   Psychiatric:         Mood and Affect: Mood normal.          Last Recorded Vitals  Blood pressure 140/88, pulse 96, temperature 36.5 °C (97.7 °F), resp. rate 24, height 1.626 m (5' 4\"), weight 72.6 kg (160 lb), SpO2 93 %.    Relevant Results  Results for orders placed or performed during the hospital encounter of 10/09/23 (from the past 24 hour(s))   CBC and Auto Differential   Result Value Ref Range    WBC 21.9 (H) 4.4 - 11.3 x10*3/uL    nRBC 0.0 0.0 - 0.0 /100 WBCs    RBC 5.37 (H) 4.00 - 5.20 x10*6/uL    Hemoglobin 15.5 12.0 - 16.0 g/dL    Hematocrit 47.9 (H) 36.0 - 46.0 %    MCV 89 80 - 100 fL    MCH 28.9 26.0 - 34.0 pg    MCHC 32.4 32.0 - 36.0 g/dL    RDW 13.2 11.5 - 14.5 %    Platelets 442 150 - 450 x10*3/uL    MPV 10.5 7.5 - 11.5 fL    Neutrophils % 87.9 40.0 - 80.0 %    Immature Granulocytes %, Automated 0.6 0.0 - 0.9 %    Lymphocytes % 6.2 13.0 - 44.0 %    Monocytes % 3.8 2.0 - 10.0 %    Eosinophils % 1.0 0.0 - 6.0 %    Basophils % 0.5 0.0 - 2.0 %    Neutrophils Absolute 19.26 (H) 1.60 - 5.50 x10*3/uL    Immature Granulocytes Absolute, Automated 0.13 0.00 - 0.50 x10*3/uL    Lymphocytes Absolute 1.37 0.80 - 3.00 x10*3/uL    Monocytes Absolute 0.83 (H) 0.05 - 0.80 x10*3/uL    Eosinophils Absolute 0.23 0.00 - 0.40 x10*3/uL    Basophils Absolute 0.11 (H) 0.00 - 0.10 x10*3/uL   Comprehensive " metabolic panel   Result Value Ref Range    Glucose 170 (H) 65 - 99 mg/dL    Sodium 139 133 - 145 mmol/L    Potassium 4.8 3.4 - 5.1 mmol/L    Chloride 104 97 - 107 mmol/L    Bicarbonate 22 (L) 24 - 31 mmol/L    Urea Nitrogen 20 8 - 25 mg/dL    Creatinine 1.30 0.40 - 1.60 mg/dL    eGFR 41 (L) >60 mL/min/1.73m*2    Calcium 9.6 8.5 - 10.4 mg/dL    Albumin 3.8 3.5 - 5.0 g/dL    Alkaline Phosphatase 94 35 - 125 U/L    Total Protein 7.0 5.9 - 7.9 g/dL    AST 19 5 - 40 U/L    Bilirubin, Total 0.5 0.1 - 1.2 mg/dL    ALT 20 5 - 40 U/L    Anion Gap 13 <=19 mmol/L   Lipase   Result Value Ref Range    Lipase 35 16 - 63 U/L   Blood Culture    Specimen: Peripheral Venipuncture; Blood culture   Result Value Ref Range    Blood Culture Loaded on Instrument - Culture in progress    Blood Culture    Specimen: Peripheral Venipuncture; Blood culture   Result Value Ref Range    Blood Culture Loaded on Instrument - Culture in progress    SARS-CoV-2 RT PCR   Result Value Ref Range    Coronavirus 2019, PCR Not Detected Not Detected   BLOOD GAS LACTIC ACID, VENOUS   Result Value Ref Range    POCT Lactate, Venous 2.1 (H) 0.4 - 2.0 mmol/L   Blood Gas Lactic Acid, Venous   Result Value Ref Range    POCT Lactate, Venous 0.9 0.4 - 2.0 mmol/L       CT abdomen pelvis wo IV contrast    Result Date: 10/9/2023  Interpreted By:  Mary Esparza, STUDY: CT ABDOMEN PELVIS WO IV CONTRAST; 10/9/2023 6:50 am   INDICATION: Signs/Symptoms:generalized abd pain, diarrhea,   COMPARISON: 06/20/2020.   ACCESSION NUMBER(S): [id number]   ORDERING CLINICIAN: JUAN RAMON GILLETTE   TECHNIQUE: Oral and IV contrast was not administered. CT of the Abdomen and Pelvis without contrast was performed. Axial, sagittal and coronal reformatted images were reviewed.   FINDINGS: Included lung bases demonstrate basilar dependent atelectasis. Right basilar linear appearing scar suggested. Also, minimal scar within the lingula. Distal esophagus is unremarkable. There small  sliding hiatal hernia.   Liver is unremarkable within limits of this unenhanced CT.   Gallbladder demonstrates fluid sludge level. Component of noncalcified stones also suggested. This is unchanged from prior imaging   Spleen and adrenal glands are unremarkable.   Pancreas is unremarkable within limits of this unenhanced CT   Right kidney demonstrates peripelvic cysts. This is unchanged from remote imaging. No hydronephrosis or nephrolithiasis   Left kidney demonstrates findings suggesting peripelvic cysts component which is seen on remote imaging. No hydronephrosis or nephrolithiasis   Retroperitoneum demonstrates no lymphadenopathy. There is moderate vascular calcification of the abdominal aorta. Minimal ascites demonstrated within the perihepatic and perisplenic locations tracking into the lower pelvis.   Loops of large bowel demonstrate diffuse descending and sigmoid diverticulosis. There is component of mild wall thickening suggested within the mid sigmoid colon with eccentric component of wall thickening which may reflect sequela of chronic diverticulitis. This measures up to 7 mm. However, follow-up colonoscopy once patient is able to tolerate to further characterize the mucosa.   Small bowel loops demonstrate generalized perienteric fat stranding about the distal jejunum and ileum with component of mild generalized wall thickening measuring up to 4 mm. There is mild stool formation within the loops of small bowel in this area. Small bowel loops proximally are nondilated. Small umbilical hernia identified measuring 3.4 cm in the transverse dimension. Mild fluid within the umbilical hernia. This is unchanged from remote imaging. Stomach is contracted. Small sliding hiatal hernia.   CT pelvis:   Unopacified bladder is contracted. There is no stranding of the perivesical fat. No pelvic lymphadenopathy. Mild free fluid demonstrated. Uterus and adnexa are unremarkable.   Visualized osseous structures demonstrate  no compression deformity in the spine. Multilevel endplate degenerative changes are demonstrated with multilevel anterior and lateral osteophyte formation lower thoracic/upper lumbar spine. There is no compression deformity. Severe facet arthropathy lower lumbar spine. There is mild narrowing thecal sac L3/4 and L4/5         1. Generalized wall thickening and perienteric fat stranding about the distal jejunum/terminal ileum with component of at least moderate stranding of the perienteric fat. No dilated loops of bowel in this area and may reflect underlying enteritis with either inflammatory infectious etiology in the differential. Correlate with patient's history. There is component of stool formation within the small bowel in this area without definite obstruction. However, correlate with adynamic ileus   2. Diffuse descending and sigmoid diverticulosis with short segment wall thickening of the mid sigmoid colon may reflect sequela of chronic diverticulitis. However, consider follow-up colonoscopy once patient's symptoms improve to evaluate the mucosa.   3. Mild ascites.   4. Small umbilical hernia containing fat and mild fluid as seen on prior imaging.   MACRO: None.   Signed by: Mary Esparza 10/9/2023 8:12 AM Dictation workstation:   [id number]      Assessment/Plan   Abdominal Pain  Diarrhea  CT Abnormal  Plan:  Most likely Diverticulitis with presenting symptoms vs infectious   -CT findings show wall thickening and perienteric fat stranding about    the distal jejunum/terminal ileum with component of at least moderate    stranding of the perienteric fat. Descending and sigmoid diverticulosis with short segment     wall thickening of the mid sigmoid colon may reflect sequela of chronic diverticulitis.  -Continue IV antibiotics as ordered  -stool cultures ordered  -clear liquid diet for now   -supportive care measures  -Will need follow-up colonoscopy in 6-8 weeks. May schedule this outpatient  -Monitor  stools  -Monitor CBC

## 2023-10-09 NOTE — ED TRIAGE NOTES
Pt brought in by EMS due to having diarrhea and having a syncopal episode. Pt was hypotensive upon arrival and given a fluid bolus with  a pressure bag.

## 2023-10-09 NOTE — PROGRESS NOTES
Marzena Robb is a 83 y.o. female on day 0 of admission presenting with Diverticulitis.    Patient from home, alone with cat. PCP is Dr Powell.   Plan is to discharge home with no skilled needs once medically cleared.     Christina Nolan RN

## 2023-10-10 LAB
ALBUMIN SERPL-MCNC: 3 G/DL (ref 3.5–5)
ANION GAP SERPL CALC-SCNC: 8 MMOL/L
BACTERIA UR CULT: NORMAL
BASOPHILS # BLD AUTO: 0.06 X10*3/UL (ref 0–0.1)
BASOPHILS NFR BLD AUTO: 0.7 %
BUN SERPL-MCNC: 14 MG/DL (ref 8–25)
CALCIUM SERPL-MCNC: 8.3 MG/DL (ref 8.5–10.4)
CHLORIDE SERPL-SCNC: 111 MMOL/L (ref 97–107)
CO2 SERPL-SCNC: 20 MMOL/L (ref 24–31)
CREAT SERPL-MCNC: 1.4 MG/DL (ref 0.4–1.6)
EOSINOPHIL # BLD AUTO: 0.59 X10*3/UL (ref 0–0.4)
EOSINOPHIL NFR BLD AUTO: 7.3 %
ERYTHROCYTE [DISTWIDTH] IN BLOOD BY AUTOMATED COUNT: 13.2 % (ref 11.5–14.5)
GFR SERPL CREATININE-BSD FRML MDRD: 37 ML/MIN/1.73M*2
GLUCOSE SERPL-MCNC: 89 MG/DL (ref 65–99)
HCT VFR BLD AUTO: 33.1 % (ref 36–46)
HGB BLD-MCNC: 10.5 G/DL (ref 12–16)
IMM GRANULOCYTES # BLD AUTO: 0.02 X10*3/UL (ref 0–0.5)
IMM GRANULOCYTES NFR BLD AUTO: 0.2 % (ref 0–0.9)
LYMPHOCYTES # BLD AUTO: 1.64 X10*3/UL (ref 0.8–3)
LYMPHOCYTES NFR BLD AUTO: 20.3 %
MCH RBC QN AUTO: 29.1 PG (ref 26–34)
MCHC RBC AUTO-ENTMCNC: 31.7 G/DL (ref 32–36)
MCV RBC AUTO: 92 FL (ref 80–100)
MONOCYTES # BLD AUTO: 0.6 X10*3/UL (ref 0.05–0.8)
MONOCYTES NFR BLD AUTO: 7.4 %
NEUTROPHILS # BLD AUTO: 5.18 X10*3/UL (ref 1.6–5.5)
NEUTROPHILS NFR BLD AUTO: 64.1 %
NRBC BLD-RTO: 0 /100 WBCS (ref 0–0)
PHOSPHATE SERPL-MCNC: 3.1 MG/DL (ref 2.5–4.5)
PLATELET # BLD AUTO: 301 X10*3/UL (ref 150–450)
PMV BLD AUTO: 10.8 FL (ref 7.5–11.5)
POTASSIUM SERPL-SCNC: 4.1 MMOL/L (ref 3.4–5.1)
RBC # BLD AUTO: 3.61 X10*6/UL (ref 4–5.2)
SODIUM SERPL-SCNC: 139 MMOL/L (ref 133–145)
WBC # BLD AUTO: 8.1 X10*3/UL (ref 4.4–11.3)

## 2023-10-10 PROCEDURE — 1100000001 HC PRIVATE ROOM DAILY

## 2023-10-10 PROCEDURE — G0378 HOSPITAL OBSERVATION PER HR: HCPCS

## 2023-10-10 PROCEDURE — 85025 COMPLETE CBC W/AUTO DIFF WBC: CPT | Performed by: INTERNAL MEDICINE

## 2023-10-10 PROCEDURE — 97161 PT EVAL LOW COMPLEX 20 MIN: CPT | Mod: GP

## 2023-10-10 PROCEDURE — 97116 GAIT TRAINING THERAPY: CPT | Mod: GP

## 2023-10-10 PROCEDURE — 36415 COLL VENOUS BLD VENIPUNCTURE: CPT | Performed by: INTERNAL MEDICINE

## 2023-10-10 PROCEDURE — 2500000004 HC RX 250 GENERAL PHARMACY W/ HCPCS (ALT 636 FOR OP/ED): Performed by: INTERNAL MEDICINE

## 2023-10-10 PROCEDURE — 97530 THERAPEUTIC ACTIVITIES: CPT | Mod: GO

## 2023-10-10 PROCEDURE — 80069 RENAL FUNCTION PANEL: CPT | Performed by: INTERNAL MEDICINE

## 2023-10-10 PROCEDURE — 2500000001 HC RX 250 WO HCPCS SELF ADMINISTERED DRUGS (ALT 637 FOR MEDICARE OP): Performed by: INTERNAL MEDICINE

## 2023-10-10 RX ADMIN — Medication 1 TABLET: at 16:47

## 2023-10-10 RX ADMIN — Medication 1 TABLET: at 08:24

## 2023-10-10 RX ADMIN — SODIUM CHLORIDE 100 ML/HR: 900 INJECTION, SOLUTION INTRAVENOUS at 11:55

## 2023-10-10 RX ADMIN — PIPERACILLIN SODIUM AND TAZOBACTAM SODIUM 3.38 G: 3; .375 INJECTION, SOLUTION INTRAVENOUS at 20:38

## 2023-10-10 RX ADMIN — PIPERACILLIN SODIUM AND TAZOBACTAM SODIUM 3.38 G: 3; .375 INJECTION, SOLUTION INTRAVENOUS at 08:23

## 2023-10-10 RX ADMIN — ALLOPURINOL 100 MG: 100 TABLET ORAL at 08:24

## 2023-10-10 RX ADMIN — PIPERACILLIN SODIUM AND TAZOBACTAM SODIUM 3.38 G: 3; .375 INJECTION, SOLUTION INTRAVENOUS at 02:09

## 2023-10-10 RX ADMIN — PANTOPRAZOLE SODIUM 40 MG: 40 TABLET, DELAYED RELEASE ORAL at 07:00

## 2023-10-10 RX ADMIN — PIPERACILLIN SODIUM AND TAZOBACTAM SODIUM 3.38 G: 3; .375 INJECTION, SOLUTION INTRAVENOUS at 13:35

## 2023-10-10 RX ADMIN — LEVOTHYROXINE SODIUM 112 MCG: 0.11 TABLET ORAL at 08:24

## 2023-10-10 ASSESSMENT — COGNITIVE AND FUNCTIONAL STATUS - GENERAL
MOBILITY SCORE: 24
DAILY ACTIVITIY SCORE: 24
MOBILITY SCORE: 24
DAILY ACTIVITIY SCORE: 24
DAILY ACTIVITIY SCORE: 24
MOBILITY SCORE: 24

## 2023-10-10 ASSESSMENT — PAIN - FUNCTIONAL ASSESSMENT
PAIN_FUNCTIONAL_ASSESSMENT: 0-10

## 2023-10-10 ASSESSMENT — PAIN SCALES - GENERAL
PAINLEVEL_OUTOF10: 0 - NO PAIN

## 2023-10-10 NOTE — PROGRESS NOTES
Occupational Therapy    Occupational Therapy Treatment    Name: Marzena Robb  MRN: [id number]  : 1940  Date: 10/10/23  Time Calculation  Start Time: 1450  Stop Time: 1505  Time Calculation (min): 15 min    Assessment:  OT Assessment: Pt is now functioning at an independent level overall most likely at her baseline and she has met all established goals.  Prognosis: Good  Barriers to Discharge: None  Evaluation/Treatment Tolerance: Patient tolerated treatment well  Medical Staff Made Aware: Yes    Plan:  Treatment Interventions: ADL retraining, Functional transfer training, Endurance training, Patient/family training, Equipment evaluation/education  No Skilled OT: At baseline function  OT Frequency: 3 times per week  OT Discharge Recommendations: No further acute OT    Subjective     General:  OT Last Visit  OT Received On: 10/10/23  General  Reason for Referral: Diverticulitis, abd pain, diarrhea  Referred By: Dr. Cain  Past Medical History Relevant to Rehab: PE, OA, major depressive disorder, back strain  Family/Caregiver Present: No  Prior to Session Communication: Bedside nurse  Patient Position Received: Bed, 3 rail up  General Comment: Pt supine in bed upon arrival and agreeable to OT treatment.      Pain Assessment:  Pain Assessment  Pain Assessment: 0-10  Pain Score: 0 - No pain     Objective                            Toileting  Toileting Comments: Pt has been up ad pascual independently to the bathroom per nursing and skilled PT.       Bed Mobility/Transfers: Bed Mobility  Bed Mobility: Yes  Bed Mobility 1  Bed Mobility 1: Supine to sitting  Level of Assistance 1: Independent    Transfers  Transfer: Yes  Transfer 1  Transfer From 1: Bed to  Transfer to 1: Stand  Technique 1: Sit to stand  Transfer Level of Assistance 1: Independent  Trials/Comments 1: supervision for safety  Transfers 2  Transfer From 2: Stand to  Transfer to 2: Chair with arms  Technique 2: Stand to sit  Transfer Level of Assistance  2: Independent       Therapy/Activity: Therapeutic Activity  Therapeutic Activity Performed: Yes  Therapeutic Activity 1: Pt tolerated functional mobility for a household distance using no device independently.       Strength:  Strength  Strength Comments: LUISANA LE 4-/5                     Outcome Measures:  [facility] Daily Activity  Putting on and taking off regular lower body clothing: None  Bathing (including washing, rinsing, drying): None  Putting on and taking off regular upper body clothing: None  Toileting, which includes using toilet, bedpan or urinal: None  Taking care of personal grooming such as brushing teeth: None  Eating Meals: None  Daily Activity - Total Score: 24        Education Documentation  No documentation found.  Education Comments  No comments found.      Goals:  Encounter Problems       Encounter Problems (Resolved)       Mobility       STG - Patient will ambulate 25' x 1 INDEP (Met)       Start:  10/10/23    Expected End:  10/10/23    Resolved:  10/10/23            OT Goals       OT Goal 1 (Met)       Start:  10/09/23    Expected End:  10/23/23    Resolved:  10/10/23    Pt will complete all functional transfers and mobility with independence.         OT Goal 2 (Met)       Start:  10/09/23    Expected End:  10/23/23    Resolved:  10/10/23    Pt will complete LB dressing with independence.         OT Goal 3 (Met)       Start:  10/09/23    Expected End:  10/23/23    Resolved:  10/10/23    Pt will complete all toileting tasks with independence.         OT Goal 4 (Met)       Start:  10/09/23    Expected End:  10/23/23    Resolved:  10/10/23    Pt will complete all grooming tasks with independence in standing.         OT Goal 5 (Met)       Start:  10/09/23    Expected End:  10/23/23    Resolved:  10/10/23    Pt will complete homemaking tasks and household distance functional mobility with independence.

## 2023-10-10 NOTE — CARE PLAN
Problem: Fall/Injury  Goal: Not fall by end of shift  Outcome: Progressing     Problem: Pain - Adult  Goal: Verbalizes/displays adequate comfort level or baseline comfort level  Outcome: Progressing     Problem: Safety - Adult  Goal: Free from fall injury  Outcome: Progressing     Patient alert and oriented. Up in chair. Denies pain. IVF and antibiotics.   The clinical goals for the shift include maintain safety, keep pain within patients acceptable range

## 2023-10-10 NOTE — PROGRESS NOTES
"Marzena Robb is a 83 y.o. female on day 1 of admission presenting with Diverticulitis.    Subjective   Patient denies any abdominal pain, diarrhea, nausea/vomiting.  No melena/hematochezia. She is tolerating clear liquids.  She is feeling hungry and would like to eat.        Objective     Physical Exam  HENT:      Head: Normocephalic.      Nose: Nose normal.      Mouth/Throat:      Mouth: Mucous membranes are moist.   Eyes:      Pupils: Pupils are equal, round, and reactive to light.   Cardiovascular:      Rate and Rhythm: Normal rate.   Pulmonary:      Effort: Pulmonary effort is normal.   Abdominal:      General: Abdomen is flat. Bowel sounds are normal.   Musculoskeletal:         General: Normal range of motion.      Cervical back: Normal range of motion.   Skin:     General: Skin is warm.      Capillary Refill: Capillary refill takes less than 2 seconds.   Neurological:      General: No focal deficit present.      Mental Status: She is alert.   Psychiatric:         Mood and Affect: Mood normal.         Last Recorded Vitals  Blood pressure 123/74, pulse 91, temperature 36.6 °C (97.9 °F), temperature source Oral, resp. rate 18, height 1.626 m (5' 4\"), weight 72.6 kg (160 lb), SpO2 97 %.  Intake/Output last 3 Shifts:  I/O last 3 completed shifts:  In: 2491.7 (34.3 mL/kg) [I.V.:291.7 (4 mL/kg); IV Piggyback:2200]  Out: - (0 mL/kg)   Weight: 72.6 kg     Relevant Results   CT abdomen pelvis wo IV contrast    Result Date: 10/9/2023  Interpreted By:  Mary Esparza, STUDY: CT ABDOMEN PELVIS WO IV CONTRAST; 10/9/2023 6:50 am   INDICATION: Signs/Symptoms:generalized abd pain, diarrhea,   COMPARISON: 06/20/2020.   ACCESSION NUMBER(S): [id number]   ORDERING CLINICIAN: JUAN RAMON GILLETTE   TECHNIQUE: Oral and IV contrast was not administered. CT of the Abdomen and Pelvis without contrast was performed. Axial, sagittal and coronal reformatted images were reviewed.   FINDINGS: Included lung bases demonstrate basilar " dependent atelectasis. Right basilar linear appearing scar suggested. Also, minimal scar within the lingula. Distal esophagus is unremarkable. There small sliding hiatal hernia.   Liver is unremarkable within limits of this unenhanced CT.   Gallbladder demonstrates fluid sludge level. Component of noncalcified stones also suggested. This is unchanged from prior imaging   Spleen and adrenal glands are unremarkable.   Pancreas is unremarkable within limits of this unenhanced CT   Right kidney demonstrates peripelvic cysts. This is unchanged from remote imaging. No hydronephrosis or nephrolithiasis   Left kidney demonstrates findings suggesting peripelvic cysts component which is seen on remote imaging. No hydronephrosis or nephrolithiasis   Retroperitoneum demonstrates no lymphadenopathy. There is moderate vascular calcification of the abdominal aorta. Minimal ascites demonstrated within the perihepatic and perisplenic locations tracking into the lower pelvis.   Loops of large bowel demonstrate diffuse descending and sigmoid diverticulosis. There is component of mild wall thickening suggested within the mid sigmoid colon with eccentric component of wall thickening which may reflect sequela of chronic diverticulitis. This measures up to 7 mm. However, follow-up colonoscopy once patient is able to tolerate to further characterize the mucosa.   Small bowel loops demonstrate generalized perienteric fat stranding about the distal jejunum and ileum with component of mild generalized wall thickening measuring up to 4 mm. There is mild stool formation within the loops of small bowel in this area. Small bowel loops proximally are nondilated. Small umbilical hernia identified measuring 3.4 cm in the transverse dimension. Mild fluid within the umbilical hernia. This is unchanged from remote imaging. Stomach is contracted. Small sliding hiatal hernia.   CT pelvis:   Unopacified bladder is contracted. There is no stranding of the  perivesical fat. No pelvic lymphadenopathy. Mild free fluid demonstrated. Uterus and adnexa are unremarkable.   Visualized osseous structures demonstrate no compression deformity in the spine. Multilevel endplate degenerative changes are demonstrated with multilevel anterior and lateral osteophyte formation lower thoracic/upper lumbar spine. There is no compression deformity. Severe facet arthropathy lower lumbar spine. There is mild narrowing thecal sac L3/4 and L4/5         1. Generalized wall thickening and perienteric fat stranding about the distal jejunum/terminal ileum with component of at least moderate stranding of the perienteric fat. No dilated loops of bowel in this area and may reflect underlying enteritis with either inflammatory infectious etiology in the differential. Correlate with patient's history. There is component of stool formation within the small bowel in this area without definite obstruction. However, correlate with adynamic ileus   2. Diffuse descending and sigmoid diverticulosis with short segment wall thickening of the mid sigmoid colon may reflect sequela of chronic diverticulitis. However, consider follow-up colonoscopy once patient's symptoms improve to evaluate the mucosa.   3. Mild ascites.   4. Small umbilical hernia containing fat and mild fluid as seen on prior imaging.   MACRO: None.   Signed by: Mary Esparza 10/9/2023 8:12 AM Dictation workstation:   [id number]       Assessment/Plan   Principal Problem:  Diverticulitis  Diarrhea  No stool samples collected.  No reported diarrhea, seems to be resolved  may advance diet  If tolerates diet may switch to p.o. antibiotics   Will need to schedule follow-up outpatient visit in 6 to 8 weeks  After a complete recovery (typically takes six to eight weeks), patients are advised to consume a high-fiber diet.    Leticia Mejia, APRN-CNP

## 2023-10-10 NOTE — CARE PLAN
Problem: OT Goals  Goal: OT Goal 1  Description: Pt will complete all functional transfers and mobility with independence.  Outcome: Met  Goal: OT Goal 2  Description: Pt will complete LB dressing with independence.  Outcome: Met  Goal: OT Goal 3  Description: Pt will complete all toileting tasks with independence.  Outcome: Met  Goal: OT Goal 4  Description: Pt will complete all grooming tasks with independence in standing.  Outcome: Met  Goal: OT Goal 5  Description: Pt will complete homemaking tasks and household distance functional mobility with independence.  Outcome: Met

## 2023-10-10 NOTE — PROGRESS NOTES
Physical Therapy    Physical Therapy Evaluation    Patient Name: Marzena Robb  MRN: [id number]  Today's Date: 10/10/2023   Time Calculation  Start Time: 0824  Stop Time: 0847  Time Calculation (min): 23 min    Assessment/Plan   PT Assessment  Rehab Prognosis: Good  Evaluation/Treatment Tolerance: Patient tolerated treatment well  Medical Staff Made Aware: Yes  Strengths: Physical health  End of Session Communication: Bedside nurse  Assessment Comment: pt is safe and independent with functional mobility activites; no further PT needs. D/C PT Services  End of Session Patient Position: Up in chair (call button in reach)  IP OR SWING BED PT PLAN  Inpatient or Swing Bed: Inpatient  PT Plan  PT Plan: PT Eval only  PT Eval Only Reason: Safe to return home  PT Discharge Recommendations: No further acute PT  Equipment Recommended upon Discharge:  (none)  PT Recommended Transfer Status: Independent      Subjective   General Visit Information:  General  Reason for Referral: Diverticulitis, abd pain, diarrhea  Referred By: Dr. Cain  Past Medical History Relevant to Rehab: PE, OA, major depressive disorder, back strain  Prior to Session Communication: Bedside nurse  Patient Position Received: Bed, 2 rail up  General Comment:  (Isolation to R/O C-Diff)  Home Living:  Home Living  Lives With: Alone  Home Adaptive Equipment: Cane  Home Layout: One level  Home Access:  (1 entry step)  Bathroom Shower/Tub: Walk-in shower  Bathroom Equipment: Grab bars in shower  Prior Level of Function:  Prior Function Per Pt/Caregiver Report  Level of [place]: Independent with ADLs and functional transfers, Independent with homemaking with ambulation  Receives Help From: Family, Friends  Ambulatory Assistance:  (uses no AD)  Leisure: + drives  Prior Function Comments: pt reported being active and independent       Objective   Pain:  Pain Assessment  Pain Assessment:  (0/10)  Cognition:  Cognition  Overall Cognitive Status: Within Functional  Limits  Orientation Level: Oriented X4    General Assessments:   Activity Tolerance  Endurance:  (WNL)    Sensation  Sensation Comment: WFL    Coordination  Coordination Comment: good B arm swing. no balance deficits noted    Static Sitting Balance  Static Sitting-Balance Support:  (NORMAL)    Static Standing Balance  Static Standing-Balance Support:  (NORMAL)  Dynamic Standing Balance  Dynamic Standing-Balance Support:  (GOOD +)  Functional Assessments:  Bed Mobility  Bed Mobility:  (supine to sit with INDEPENDENT)    Transfers  Transfer:  (sit <-> stand DIST SUP/INDEP)    Ambulation/Gait Training  Ambulation/Gait Training Performed:  (pt amb 25' x 1 without assistive device in room with DIST SUP/MOD Indep)    Outcome Measures:  [facility] Basic Mobility  Turning from your back to your side while in a flat bed without using bedrails: None  Moving from lying on your back to sitting on the side of a flat bed without using bedrails: None  Moving to and from bed to chair (including a wheelchair): None  Standing up from a chair using your arms (e.g. wheelchair or bedside chair): None  To walk in hospital room: None  Climbing 3-5 steps with railing: None  Basic Mobility - Total Score: 24    Encounter Problems         Encounter Problems (Resolved)       Mobility       STG - Patient will ambulate 25' x 1 INDEP (Met)       Start:  10/10/23    Expected End:  10/10/23    Resolved:  10/10/23                Education Documentation  Mobility Training, taught by Sea Parson, PT at 10/10/2023  9:58 AM.  Learner: Patient  Readiness: Eager  Method: Explanation  Response: Verbalizes Understanding

## 2023-10-10 NOTE — PROGRESS NOTES
Marzena Robb is a 83 y.o. female on day 1 of admission presenting with Diverticulitis.      Subjective   Patient feeling much improved.  Tolerating diet well.  No stools since admission.       Objective     Last Recorded Vitals  /72 (BP Location: Right arm, Patient Position: Sitting)   Pulse 92   Temp 36.7 °C (98.1 °F) (Oral)   Resp 16   Wt 72.6 kg (160 lb)   SpO2 97%   Intake/Output last 3 Shifts:    Intake/Output Summary (Last 24 hours) at 10/10/2023 1635  Last data filed at 10/10/2023 1334  Gross per 24 hour   Intake 1641.67 ml   Output --   Net 1641.67 ml       Admission Weight  Weight: 72.6 kg (160 lb) (10/09/23 0505)    Daily Weight  10/09/23 : 72.6 kg (160 lb)    Image Results  CT abdomen pelvis wo IV contrast  Narrative: Interpreted By:  Mary Esparza,   STUDY:  CT ABDOMEN PELVIS WO IV CONTRAST; 10/9/2023 6:50 am      INDICATION:  Signs/Symptoms:generalized abd pain, diarrhea,      COMPARISON:  06/20/2020.      ACCESSION NUMBER(S):  [id number]      ORDERING CLINICIAN:  JUAN RAMON GILLETTE      TECHNIQUE:  Oral and IV contrast was not administered. CT of the Abdomen and  Pelvis without contrast was performed. Axial, sagittal and coronal  reformatted images were reviewed.      FINDINGS:  Included lung bases demonstrate basilar dependent atelectasis. Right  basilar linear appearing scar suggested. Also, minimal scar within  the lingula. Distal esophagus is unremarkable. There small sliding  hiatal hernia.      Liver is unremarkable within limits of this unenhanced CT.      Gallbladder demonstrates fluid sludge level. Component of  noncalcified stones also suggested. This is unchanged from prior  imaging      Spleen and adrenal glands are unremarkable.      Pancreas is unremarkable within limits of this unenhanced CT      Right kidney demonstrates peripelvic cysts. This is unchanged from  remote imaging. No hydronephrosis or nephrolithiasis      Left kidney demonstrates findings suggesting  peripelvic cysts  component which is seen on remote imaging. No hydronephrosis or  nephrolithiasis      Retroperitoneum demonstrates no lymphadenopathy. There is moderate  vascular calcification of the abdominal aorta. Minimal ascites  demonstrated within the perihepatic and perisplenic locations  tracking into the lower pelvis.      Loops of large bowel demonstrate diffuse descending and sigmoid  diverticulosis. There is component of mild wall thickening suggested  within the mid sigmoid colon with eccentric component of wall  thickening which may reflect sequela of chronic diverticulitis. This  measures up to 7 mm. However, follow-up colonoscopy once patient is  able to tolerate to further characterize the mucosa.      Small bowel loops demonstrate generalized perienteric fat stranding  about the distal jejunum and ileum with component of mild generalized  wall thickening measuring up to 4 mm. There is mild stool formation  within the loops of small bowel in this area. Small bowel loops  proximally are nondilated. Small umbilical hernia identified  measuring 3.4 cm in the transverse dimension. Mild fluid within the  umbilical hernia. This is unchanged from remote imaging. Stomach is  contracted. Small sliding hiatal hernia.      CT pelvis:      Unopacified bladder is contracted. There is no stranding of the  perivesical fat. No pelvic lymphadenopathy. Mild free fluid  demonstrated. Uterus and adnexa are unremarkable.      Visualized osseous structures demonstrate no compression deformity in  the spine. Multilevel endplate degenerative changes are demonstrated  with multilevel anterior and lateral osteophyte formation lower  thoracic/upper lumbar spine. There is no compression deformity.  Severe facet arthropathy lower lumbar spine. There is mild narrowing  thecal sac L3/4 and L4/5          Impression: 1. Generalized wall thickening and perienteric fat stranding about  the distal jejunum/terminal ileum with  component of at least moderate  stranding of the perienteric fat. No dilated loops of bowel in this  area and may reflect underlying enteritis with either inflammatory  infectious etiology in the differential. Correlate with patient's  history. There is component of stool formation within the small bowel  in this area without definite obstruction. However, correlate with  adynamic ileus      2. Diffuse descending and sigmoid diverticulosis with short segment  wall thickening of the mid sigmoid colon may reflect sequela of  chronic diverticulitis. However, consider follow-up colonoscopy once  patient's symptoms improve to evaluate the mucosa.      3. Mild ascites.      4. Small umbilical hernia containing fat and mild fluid as seen on  prior imaging.      MACRO:  None.      Signed by: Mary Esparza 10/9/2023 8:12 AM  Dictation workstation:   [id number]      Physical Exam    Relevant Results               Assessment/Plan                  Principal Problem:    Diverticulitis    Diverticulitis  -With sepsis.  Now improving on Zosyn and fluids  -Can discontinue IV fluids  -Continue Zosyn for the time being.  Diet per gastroenterology  -If doing well tomorrow, if gastroenterology agrees, possible transition of by mouth Cipro Flagyl and discharge.              Devang Cain, DO

## 2023-10-11 ENCOUNTER — TELEPHONE (OUTPATIENT)
Dept: PRIMARY CARE | Facility: CLINIC | Age: 83
End: 2023-10-11
Payer: MEDICARE

## 2023-10-11 VITALS
RESPIRATION RATE: 16 BRPM | HEIGHT: 64 IN | TEMPERATURE: 98.1 F | DIASTOLIC BLOOD PRESSURE: 62 MMHG | BODY MASS INDEX: 27.31 KG/M2 | WEIGHT: 160 LBS | SYSTOLIC BLOOD PRESSURE: 152 MMHG | OXYGEN SATURATION: 97 % | HEART RATE: 90 BPM

## 2023-10-11 LAB
ALBUMIN SERPL-MCNC: 3.2 G/DL (ref 3.5–5)
ANION GAP SERPL CALC-SCNC: 7 MMOL/L
BASOPHILS # BLD AUTO: 0.06 X10*3/UL (ref 0–0.1)
BASOPHILS NFR BLD AUTO: 0.7 %
BUN SERPL-MCNC: 11 MG/DL (ref 8–25)
CALCIUM SERPL-MCNC: 8.8 MG/DL (ref 8.5–10.4)
CHLORIDE SERPL-SCNC: 109 MMOL/L (ref 97–107)
CO2 SERPL-SCNC: 23 MMOL/L (ref 24–31)
CREAT SERPL-MCNC: 1.3 MG/DL (ref 0.4–1.6)
EOSINOPHIL # BLD AUTO: 0.6 X10*3/UL (ref 0–0.4)
EOSINOPHIL NFR BLD AUTO: 7 %
ERYTHROCYTE [DISTWIDTH] IN BLOOD BY AUTOMATED COUNT: 12.9 % (ref 11.5–14.5)
GFR SERPL CREATININE-BSD FRML MDRD: 41 ML/MIN/1.73M*2
GLUCOSE SERPL-MCNC: 94 MG/DL (ref 65–99)
HCT VFR BLD AUTO: 34.3 % (ref 36–46)
HGB BLD-MCNC: 11.3 G/DL (ref 12–16)
IMM GRANULOCYTES # BLD AUTO: 0.03 X10*3/UL (ref 0–0.5)
IMM GRANULOCYTES NFR BLD AUTO: 0.4 % (ref 0–0.9)
LYMPHOCYTES # BLD AUTO: 1.34 X10*3/UL (ref 0.8–3)
LYMPHOCYTES NFR BLD AUTO: 15.7 %
MCH RBC QN AUTO: 29.5 PG (ref 26–34)
MCHC RBC AUTO-ENTMCNC: 32.9 G/DL (ref 32–36)
MCV RBC AUTO: 90 FL (ref 80–100)
MONOCYTES # BLD AUTO: 0.6 X10*3/UL (ref 0.05–0.8)
MONOCYTES NFR BLD AUTO: 7 %
NEUTROPHILS # BLD AUTO: 5.92 X10*3/UL (ref 1.6–5.5)
NEUTROPHILS NFR BLD AUTO: 69.2 %
NRBC BLD-RTO: 0 /100 WBCS (ref 0–0)
PHOSPHATE SERPL-MCNC: 3 MG/DL (ref 2.5–4.5)
PLATELET # BLD AUTO: 304 X10*3/UL (ref 150–450)
PMV BLD AUTO: 9.9 FL (ref 7.5–11.5)
POTASSIUM SERPL-SCNC: 4.7 MMOL/L (ref 3.4–5.1)
RBC # BLD AUTO: 3.83 X10*6/UL (ref 4–5.2)
SODIUM SERPL-SCNC: 139 MMOL/L (ref 133–145)
WBC # BLD AUTO: 8.6 X10*3/UL (ref 4.4–11.3)

## 2023-10-11 PROCEDURE — G0378 HOSPITAL OBSERVATION PER HR: HCPCS

## 2023-10-11 PROCEDURE — 2500000001 HC RX 250 WO HCPCS SELF ADMINISTERED DRUGS (ALT 637 FOR MEDICARE OP): Performed by: INTERNAL MEDICINE

## 2023-10-11 PROCEDURE — 36415 COLL VENOUS BLD VENIPUNCTURE: CPT | Performed by: INTERNAL MEDICINE

## 2023-10-11 PROCEDURE — 2500000004 HC RX 250 GENERAL PHARMACY W/ HCPCS (ALT 636 FOR OP/ED): Performed by: INTERNAL MEDICINE

## 2023-10-11 PROCEDURE — 80069 RENAL FUNCTION PANEL: CPT | Performed by: INTERNAL MEDICINE

## 2023-10-11 PROCEDURE — 85025 COMPLETE CBC W/AUTO DIFF WBC: CPT | Performed by: INTERNAL MEDICINE

## 2023-10-11 RX ORDER — AMOXICILLIN AND CLAVULANATE POTASSIUM 875; 125 MG/1; MG/1
875 TABLET, FILM COATED ORAL
Status: DISCONTINUED | OUTPATIENT
Start: 2023-10-11 | End: 2023-10-11 | Stop reason: HOSPADM

## 2023-10-11 RX ORDER — AMOXICILLIN AND CLAVULANATE POTASSIUM 875; 125 MG/1; MG/1
875 TABLET, FILM COATED ORAL
Qty: 16 TABLET | Refills: 0 | Status: SHIPPED | OUTPATIENT
Start: 2023-10-11 | End: 2023-10-20 | Stop reason: SDUPTHER

## 2023-10-11 RX ORDER — ONDANSETRON 4 MG/1
4 TABLET, ORALLY DISINTEGRATING ORAL EVERY 8 HOURS PRN
Qty: 20 TABLET | Refills: 0 | Status: SHIPPED | OUTPATIENT
Start: 2023-10-11 | End: 2023-10-20

## 2023-10-11 RX ADMIN — LEVOTHYROXINE SODIUM 112 MCG: 0.11 TABLET ORAL at 09:55

## 2023-10-11 RX ADMIN — PIPERACILLIN SODIUM AND TAZOBACTAM SODIUM 3.38 G: 3; .375 INJECTION, SOLUTION INTRAVENOUS at 01:49

## 2023-10-11 RX ADMIN — ALLOPURINOL 100 MG: 100 TABLET ORAL at 09:54

## 2023-10-11 RX ADMIN — PANTOPRAZOLE SODIUM 40 MG: 40 TABLET, DELAYED RELEASE ORAL at 07:00

## 2023-10-11 RX ADMIN — Medication 1 TABLET: at 07:00

## 2023-10-11 RX ADMIN — PIPERACILLIN SODIUM AND TAZOBACTAM SODIUM 3.38 G: 3; .375 INJECTION, SOLUTION INTRAVENOUS at 07:00

## 2023-10-11 ASSESSMENT — COGNITIVE AND FUNCTIONAL STATUS - GENERAL
DAILY ACTIVITIY SCORE: 24
MOBILITY SCORE: 24

## 2023-10-11 ASSESSMENT — PAIN - FUNCTIONAL ASSESSMENT: PAIN_FUNCTIONAL_ASSESSMENT: 0-10

## 2023-10-11 ASSESSMENT — PAIN SCALES - GENERAL: PAINLEVEL_OUTOF10: 0 - NO PAIN

## 2023-10-11 NOTE — DISCHARGE INSTRUCTIONS
Finish your course of antibiotics  Please continue a low fiber diet for now, and transition to a high-fiber diet in about 6 to 8 weeks.  Follow-up with the listed physicians, both Dr. Miller and with gastroenterology.

## 2023-10-11 NOTE — CARE PLAN
Problem: Fall/Injury  Goal: Not fall by end of shift  Outcome: Progressing  Goal: Be free from injury by end of the shift  Outcome: Progressing  Goal: Pace activities to prevent fatigue by end of the shift  Outcome: Progressing     Problem: Safety - Adult  Goal: Free from fall injury  Outcome: Progressing     Problem: Discharge Planning  Goal: Discharge to home or other facility with appropriate resources  Outcome: Progressing     Problem: Chronic Conditions and Co-morbidities  Goal: Patient's chronic conditions and co-morbidity symptoms are monitored and maintained or improved  Outcome: Progressing   The patient's goals for the shift include      The clinical goals for the shift include pain control    Over the shift, the patient did not make progress toward the following goals. Barriers to progression include . Recommendations to address these barriers include .

## 2023-10-11 NOTE — NURSING NOTE
Assumed care of patient after report from departing RN. Pt lying in bed watching TV, respirations even and unlabored, and no distress noted on exam. Patient expresses no needs at this time. Bed in locked and lowered position with call light within reach. All other safety measures are intact. Will continue with current plan of care and update as necessary.

## 2023-10-11 NOTE — NURSING NOTE
"BSSR received, assumed care of pt. Pt is laying in bed watching tv. Call light is within reach, bed is locked and in lowest position. Labs, vitals, and orders reviewed. Pt states \"no pain\".  "

## 2023-10-11 NOTE — DISCHARGE SUMMARY
Discharge Diagnosis  Diverticulitis    Issues Requiring Follow-Up  Eventual colonoscopy in about 2 months    Discharge Meds     Your medication list        START taking these medications        Instructions Last Dose Given Next Dose Due   amoxicillin-pot clavulanate 875-125 mg tablet  Commonly known as: Augmentin      Take 1 tablet (875 mg) by mouth 2 times a day with meals.       ondansetron ODT 4 mg disintegrating tablet  Commonly known as: Zofran-ODT      Take 1 tablet (4 mg) by mouth every 8 hours if needed for nausea or vomiting.              CHANGE how you take these medications        Instructions Last Dose Given Next Dose Due   lisinopril 10 mg tablet  What changed: Another medication with the same name was removed. Continue taking this medication, and follow the directions you see here.           Synthroid 112 mcg tablet  Generic drug: levothyroxine  What changed: Another medication with the same name was removed. Continue taking this medication, and follow the directions you see here.                  CONTINUE taking these medications        Instructions Last Dose Given Next Dose Due   allopurinol 100 mg tablet  Commonly known as: Zyloprim           buPROPion  mg 24 hr tablet  Commonly known as: Wellbutrin XL           lactobacillus acidophilus tablet tablet           pravastatin 20 mg tablet  Commonly known as: Pravachol                  STOP taking these medications      D3-red wine-resveratrol-malt 5,000-200 unit-mg capsule        metroNIDAZOLE 500 mg tablet  Commonly known as: Flagyl                  Where to Get Your Medications        These medications were sent to [id number] - [place], [place] - [address] [facility]  [address] [facility] [address]      Phone: [phone]   amoxicillin-pot clavulanate 875-125 mg tablet  ondansetron ODT 4 mg disintegrating tablet         Test Results Pending At Discharge  Pending Labs       Order Current Status    Blood Culture Preliminary result     Blood Culture Preliminary result            Hospital Course   83-year-old female with past medical history of diverticulitis presenting with abdominal pain, admitted for diverticulitis.  Quickly improved with Zosyn.  Seen by gastroenterology.  Patient is discharged with finishing course of antibiotics and to follow with gastroenterology and primary.  Discussed transitioning to more high-fiber diet after recovery.    Pertinent Physical Exam At Time of Discharge  Physical Exam    Outpatient Follow-Up  Future Appointments   Date Time Provider Department Center   1/18/2024  1:00 PM Kirk Miller MD [id number] Pierre Cain DO

## 2023-10-11 NOTE — PROGRESS NOTES
"Marzena Robb is a 83 y.o. female on day 2 of admission presenting with Diverticulitis.    Subjective   Denies abdominal pain, nausea, vomiting. Tolerating diet        Objective     Physical Exam  Constitutional:       Appearance: Normal appearance.   HENT:      Head: Normocephalic and atraumatic.      Mouth/Throat:      Mouth: Mucous membranes are moist.   Cardiovascular:      Rate and Rhythm: Normal rate.   Abdominal:      General: There is no distension.      Tenderness: There is no abdominal tenderness. There is no guarding.      Hernia: A hernia is present.   Musculoskeletal:         General: Normal range of motion.      Cervical back: Normal range of motion.   Skin:     General: Skin is warm.   Neurological:      General: No focal deficit present.      Mental Status: She is alert. Mental status is at baseline.   Psychiatric:         Mood and Affect: Mood normal.         Last Recorded Vitals  Blood pressure 152/62, pulse 90, temperature 36.7 °C (98.1 °F), temperature source Oral, resp. rate 16, height 1.626 m (5' 4\"), weight 72.6 kg (160 lb), SpO2 97 %.  Intake/Output last 3 Shifts:  I/O last 3 completed shifts:  In: 2166.7 (29.9 mL/kg) [P.O.:1025; I.V.:991.7 (13.7 mL/kg); IV Piggyback:150]  Out: 0 (0 mL/kg)   Weight: 72.6 kg     Relevant Results                Results for orders placed or performed during the hospital encounter of 10/09/23 (from the past 24 hour(s))   Renal Function Panel   Result Value Ref Range    Glucose 94 65 - 99 mg/dL    Sodium 139 133 - 145 mmol/L    Potassium 4.7 3.4 - 5.1 mmol/L    Chloride 109 (H) 97 - 107 mmol/L    Bicarbonate 23 (L) 24 - 31 mmol/L    Urea Nitrogen 11 8 - 25 mg/dL    Creatinine 1.30 0.40 - 1.60 mg/dL    eGFR 41 (L) >60 mL/min/1.73m*2    Calcium 8.8 8.5 - 10.4 mg/dL    Phosphorus 3.0 2.5 - 4.5 mg/dL    Albumin 3.2 (L) 3.5 - 5.0 g/dL    Anion Gap 7 <=19 mmol/L   CBC and Auto Differential   Result Value Ref Range    WBC 8.6 4.4 - 11.3 x10*3/uL    nRBC 0.0 0.0 - 0.0 /100 " WBCs    RBC 3.83 (L) 4.00 - 5.20 x10*6/uL    Hemoglobin 11.3 (L) 12.0 - 16.0 g/dL    Hematocrit 34.3 (L) 36.0 - 46.0 %    MCV 90 80 - 100 fL    MCH 29.5 26.0 - 34.0 pg    MCHC 32.9 32.0 - 36.0 g/dL    RDW 12.9 11.5 - 14.5 %    Platelets 304 150 - 450 x10*3/uL    MPV 9.9 7.5 - 11.5 fL    Neutrophils % 69.2 40.0 - 80.0 %    Immature Granulocytes %, Automated 0.4 0.0 - 0.9 %    Lymphocytes % 15.7 13.0 - 44.0 %    Monocytes % 7.0 2.0 - 10.0 %    Eosinophils % 7.0 0.0 - 6.0 %    Basophils % 0.7 0.0 - 2.0 %    Neutrophils Absolute 5.92 (H) 1.60 - 5.50 x10*3/uL    Immature Granulocytes Absolute, Automated 0.03 0.00 - 0.50 x10*3/uL    Lymphocytes Absolute 1.34 0.80 - 3.00 x10*3/uL    Monocytes Absolute 0.60 0.05 - 0.80 x10*3/uL    Eosinophils Absolute 0.60 (H) 0.00 - 0.40 x10*3/uL    Basophils Absolute 0.06 0.00 - 0.10 x10*3/uL     CT abdomen pelvis wo IV contrast    Result Date: 10/9/2023  Interpreted By:  Mary Esparza, STUDY: CT ABDOMEN PELVIS WO IV CONTRAST; 10/9/2023 6:50 am   INDICATION: Signs/Symptoms:generalized abd pain, diarrhea,   COMPARISON: 06/20/2020.   ACCESSION NUMBER(S): [id number]   ORDERING CLINICIAN: JUAN RAMON GILLETTE   TECHNIQUE: Oral and IV contrast was not administered. CT of the Abdomen and Pelvis without contrast was performed. Axial, sagittal and coronal reformatted images were reviewed.   FINDINGS: Included lung bases demonstrate basilar dependent atelectasis. Right basilar linear appearing scar suggested. Also, minimal scar within the lingula. Distal esophagus is unremarkable. There small sliding hiatal hernia.   Liver is unremarkable within limits of this unenhanced CT.   Gallbladder demonstrates fluid sludge level. Component of noncalcified stones also suggested. This is unchanged from prior imaging   Spleen and adrenal glands are unremarkable.   Pancreas is unremarkable within limits of this unenhanced CT   Right kidney demonstrates peripelvic cysts. This is unchanged from remote  imaging. No hydronephrosis or nephrolithiasis   Left kidney demonstrates findings suggesting peripelvic cysts component which is seen on remote imaging. No hydronephrosis or nephrolithiasis   Retroperitoneum demonstrates no lymphadenopathy. There is moderate vascular calcification of the abdominal aorta. Minimal ascites demonstrated within the perihepatic and perisplenic locations tracking into the lower pelvis.   Loops of large bowel demonstrate diffuse descending and sigmoid diverticulosis. There is component of mild wall thickening suggested within the mid sigmoid colon with eccentric component of wall thickening which may reflect sequela of chronic diverticulitis. This measures up to 7 mm. However, follow-up colonoscopy once patient is able to tolerate to further characterize the mucosa.   Small bowel loops demonstrate generalized perienteric fat stranding about the distal jejunum and ileum with component of mild generalized wall thickening measuring up to 4 mm. There is mild stool formation within the loops of small bowel in this area. Small bowel loops proximally are nondilated. Small umbilical hernia identified measuring 3.4 cm in the transverse dimension. Mild fluid within the umbilical hernia. This is unchanged from remote imaging. Stomach is contracted. Small sliding hiatal hernia.   CT pelvis:   Unopacified bladder is contracted. There is no stranding of the perivesical fat. No pelvic lymphadenopathy. Mild free fluid demonstrated. Uterus and adnexa are unremarkable.   Visualized osseous structures demonstrate no compression deformity in the spine. Multilevel endplate degenerative changes are demonstrated with multilevel anterior and lateral osteophyte formation lower thoracic/upper lumbar spine. There is no compression deformity. Severe facet arthropathy lower lumbar spine. There is mild narrowing thecal sac L3/4 and L4/5         1. Generalized wall thickening and perienteric fat stranding about the  distal jejunum/terminal ileum with component of at least moderate stranding of the perienteric fat. No dilated loops of bowel in this area and may reflect underlying enteritis with either inflammatory infectious etiology in the differential. Correlate with patient's history. There is component of stool formation within the small bowel in this area without definite obstruction. However, correlate with adynamic ileus   2. Diffuse descending and sigmoid diverticulosis with short segment wall thickening of the mid sigmoid colon may reflect sequela of chronic diverticulitis. However, consider follow-up colonoscopy once patient's symptoms improve to evaluate the mucosa.   3. Mild ascites.   4. Small umbilical hernia containing fat and mild fluid as seen on prior imaging.   MACRO: None.   Signed by: Mary Esparza 10/9/2023 8:12 AM Dictation workstation:   [id number]                Assessment/Plan   Principal Problem:    Diverticulitis    Acute Diverticulitis    -Tolerating diet. Pain resolved. Will switch to ninoska Augmentin BID. Please continue for 10 days    -Will need to schedule follow-up outpatient visit in 6 to 8 weeks  -After a complete recovery (typically takes six to eight weeks), patients are advised to consume a high-fiber diet.    NO barriers to DC from GI standpoint.        I spent 20 minutes in the professional and overall care of this patient.      Delma Pandey, RODRIGO-CNP

## 2023-10-11 NOTE — CARE PLAN
Problem: Fall/Injury  Goal: Not fall by end of shift  Outcome: Progressing  Goal: Be free from injury by end of the shift  Outcome: Progressing  Goal: Verbalize understanding of personal risk factors for fall in the hospital  Outcome: Progressing  Goal: Verbalize understanding of risk factor reduction measures to prevent injury from fall in the home  Outcome: Progressing  Goal: Use assistive devices by end of the shift  Outcome: Progressing  Goal: Pace activities to prevent fatigue by end of the shift  Outcome: Progressing     Problem: Pain - Adult  Goal: Verbalizes/displays adequate comfort level or baseline comfort level  Outcome: Progressing     Problem: Safety - Adult  Goal: Free from fall injury  Outcome: Progressing     Problem: Discharge Planning  Goal: Discharge to home or other facility with appropriate resources  Outcome: Progressing     Problem: Chronic Conditions and Co-morbidities  Goal: Patient's chronic conditions and co-morbidity symptoms are monitored and maintained or improved  Outcome: Progressing    The clinical goals for the shift include pain control

## 2023-10-11 NOTE — TELEPHONE ENCOUNTER
Pt released from [facility] today dx: diverticulitis. Pt states she will not follow dc meds unless you approve them. Appt?

## 2023-10-12 ENCOUNTER — PATIENT OUTREACH (OUTPATIENT)
Dept: PRIMARY CARE | Facility: CLINIC | Age: 83
End: 2023-10-12
Payer: MEDICARE

## 2023-10-12 ENCOUNTER — DOCUMENTATION (OUTPATIENT)
Dept: PRIMARY CARE | Facility: CLINIC | Age: 83
End: 2023-10-12
Payer: MEDICARE

## 2023-10-12 NOTE — PROGRESS NOTES
Discharge Facility: [facility]  Discharge Diagnosis: Diverticulitis  Admission Date: 10/9/2023  Discharge Date: 10/11/2023    PCP Appointment Date: No contact, tasked to office for scheduling  Specialist Appointment Date: None  Hospital Encounter and Summary: Linked

## 2023-10-13 DIAGNOSIS — I10 PRIMARY HYPERTENSION: ICD-10-CM

## 2023-10-13 LAB
BACTERIA BLD CULT: NORMAL
BACTERIA BLD CULT: NORMAL

## 2023-10-13 RX ORDER — LISINOPRIL 10 MG/1
10 TABLET ORAL DAILY
Qty: 90 TABLET | Refills: 3 | Status: SHIPPED | OUTPATIENT
Start: 2023-10-13 | End: 2023-10-20 | Stop reason: SDUPTHER

## 2023-10-16 NOTE — TELEPHONE ENCOUNTER
SPOKE WITH PATIENT NOTIFIED HER TO STAY ON 10 MG LISINOPRIL AND TO TAKE BP TWICE A DAY AND BRING READINGS IN AT APPT

## 2023-10-16 NOTE — TELEPHONE ENCOUNTER
PLEASE CLARIFY. PATIENT WAS IN THE HOSPITAL AND BP MEDICATION WAS CHANGED. LISINOPRIL WAS CHANGED FROM 40 MG TO 10 MG. PATIENT HAD BEEN ON 40 MG PRIOR TO HOSPITAL STAY HAS APPT FOR MONDAY PLEASE ADVISE

## 2023-10-18 ENCOUNTER — HOSPITAL ENCOUNTER (OUTPATIENT)
Dept: CARDIOLOGY | Facility: HOSPITAL | Age: 83
Discharge: HOME | End: 2023-10-18
Payer: MEDICARE

## 2023-10-18 PROBLEM — N18.30 STAGE 3 CHRONIC KIDNEY DISEASE (MULTI): Status: ACTIVE | Noted: 2023-10-18

## 2023-10-18 LAB
ATRIAL RATE: 104 BPM
P AXIS: 87 DEGREES
P OFFSET: 193 MS
P ONSET: 146 MS
PR INTERVAL: 142 MS
Q ONSET: 217 MS
QRS COUNT: 17 BEATS
QRS DURATION: 66 MS
QT INTERVAL: 328 MS
QTC CALCULATION(BAZETT): 431 MS
QTC FREDERICIA: 394 MS
R AXIS: -20 DEGREES
T AXIS: 81 DEGREES
T OFFSET: 381 MS
VENTRICULAR RATE: 104 BPM

## 2023-10-18 PROCEDURE — 93005 ELECTROCARDIOGRAM TRACING: CPT

## 2023-10-18 ASSESSMENT — ENCOUNTER SYMPTOMS
SHORTNESS OF BREATH: 0
FEVER: 0
ABDOMINAL PAIN: 0

## 2023-10-18 NOTE — PROGRESS NOTES
[facility]: [facility]  PROGRESS NOTE      Marzena Robb is a 83 y.o. female that is presenting today for Follow-up.    Assessment/Plan   Diagnoses and all orders for this visit:  Diverticulitis  Comments:  Atypical CT findings.  Would like her to follow up w/ GI.  Notably, she never had a colonoscopy.  Orders:  -     amoxicillin-pot clavulanate (Augmentin) 875-125 mg tablet; Take 1 tablet (875 mg) by mouth 2 times a day with meals.  Hypertension, unspecified type  -     lisinopril 10 mg tablet; Take 1 tablet (10 mg) by mouth once daily.  Hypothyroidism, unspecified type  -     levothyroxine (Synthroid) 88 mcg tablet; Take 1 tablet (88 mcg) by mouth once daily in the morning. Take before meals.  Hyperlipidemia, unspecified hyperlipidemia type  -     pravastatin (Pravachol) 20 mg tablet; Take 1 tablet (20 mg) by mouth once daily.  Gout, unspecified cause, unspecified chronicity, unspecified site  -     allopurinol (Zyloprim) 100 mg tablet; Take 1 tablet (100 mg) by mouth once daily.  Stage 3 chronic kidney disease, unspecified whether stage 3a or 3b CKD (CMS/HCC)  Primary hypertension    Atypical CT findings. I am not sure that this was diverticulitis. She does not want to see a gastroenterologist. She is much improved. Agrees to see GI if symptoms recur.   She understands that intraluminal lesion such as a tumor is not visualized but endoscopic would be the only way to make sure we are not missing something like this, nonetheless wants to hold off in seeing gastroenterology unless symptoms worsen.      Subjective     This is an 83-year-old woman with history of diverticulitis who presented with abdominal pain.  CT ?short segment diverticulitis, ileium/jejunum inflammation, small ascites.  Treated as diverticulitis.    She is quite a bit better at this time, finishing a course of Augmentin.  Discharge summary below:    83-year-old female with past medical history of diverticulitis presenting  with abdominal pain, admitted for diverticulitis.  Quickly improved with Zosyn.  Seen by gastroenterology.  Patient is discharged with finishing course of antibiotics and to follow with gastroenterology and primary.  Discussed transitioning to more high-fiber diet after recovery.      Review of Systems   Constitutional:  Negative for fever.   Respiratory:  Negative for shortness of breath.    Cardiovascular:  Negative for chest pain.   Gastrointestinal:  Negative for abdominal pain.   All other systems reviewed and are negative.     Objective   Vitals:    10/20/23 1120   BP: 120/80   Pulse: 86   Temp: 36.4 °C (97.6 °F)   SpO2: 97%      Body mass index is 27.98 kg/m².  Physical Exam  Vitals reviewed.   Constitutional:       Appearance: Normal appearance.   Cardiovascular:      Rate and Rhythm: Normal rate and regular rhythm.      Heart sounds: No murmur heard.  Pulmonary:      Breath sounds: Normal breath sounds. No wheezing, rhonchi or rales.   Musculoskeletal:      Right lower leg: No edema.      Left lower leg: No edema.       Diagnostic Results   Lab Results   Component Value Date    GLUCOSE 94 10/11/2023    CALCIUM 8.8 10/11/2023     10/11/2023    K 4.7 10/11/2023    CO2 23 (L) 10/11/2023     (H) 10/11/2023    BUN 11 10/11/2023    CREATININE 1.30 10/11/2023     Lab Results   Component Value Date    ALT 20 10/09/2023    AST 19 10/09/2023    ALKPHOS 94 10/09/2023    BILITOT 0.5 10/09/2023     Lab Results   Component Value Date    WBC 8.6 10/11/2023    HGB 11.3 (L) 10/11/2023    HCT 34.3 (L) 10/11/2023    MCV 90 10/11/2023     10/11/2023     Lab Results   Component Value Date    CHOL 213 (H) 07/25/2023    CHOL 206 (H) 07/22/2022    CHOL 215 (H) 07/15/2021     Lab Results   Component Value Date    HDL 62 07/25/2023    HDL 56 07/22/2022    HDL 61 07/15/2021     Lab Results   Component Value Date    LDLCALC 129 07/25/2023    LDLCALC 127 07/22/2022    LDLCALC 125 07/15/2021     Lab Results  "  Component Value Date    TRIG 112 07/25/2023    TRIG 117 07/22/2022    TRIG 147 07/15/2021     No components found for: \"CHOLHDL\"  Lab Results   Component Value Date    HGBA1C 5.7 07/25/2023     Other labs not included in the list above were reviewed either before or during this encounter.    History    Past Medical History:   Diagnosis Date    Disease of thyroid gland     Diverticulitis 10/09/2023    Hypertension     Osteoarthritis 09/06/2023    Stage 3 chronic kidney disease (CMS/HCC) 10/18/2023     Past Surgical History:   Procedure Laterality Date    FRACTURE SURGERY       Family History   Problem Relation Name Age of Onset    No Known Problems Mother      Cancer Father       Social History     Socioeconomic History    Marital status:      Spouse name: Not on file    Number of children: Not on file    Years of education: Not on file    Highest education level: Not on file   Occupational History    Not on file   Tobacco Use    Smoking status: Never    Smokeless tobacco: Never   Vaping Use    Vaping Use: Never used   Substance and Sexual Activity    Alcohol use: Not Currently    Drug use: Never    Sexual activity: Not on file   Other Topics Concern    Not on file   Social History Narrative    Not on file     Social Determinants of Health     Financial Resource Strain: Low Risk  (10/9/2023)    Overall Financial Resource Strain (CARDIA)     Difficulty of Paying Living Expenses: Not hard at all   Food Insecurity: No Food Insecurity (10/9/2023)    Hunger Vital Sign     Worried About Running Out of Food in the Last Year: Never true     Ran Out of Food in the Last Year: Never true   Transportation Needs: No Transportation Needs (10/9/2023)    PRAPARE - Transportation     Lack of Transportation (Medical): No     Lack of Transportation (Non-Medical): No   Physical Activity: Inactive (10/9/2023)    Exercise Vital Sign     Days of Exercise per Week: 0 days     Minutes of Exercise per Session: 0 min   Stress: No " Stress Concern Present (10/9/2023)    [employer] [place] [employer] - Occupational Stress Questionnaire     Feeling of Stress : Not at all   Social Connections: Unknown (10/9/2023)    Social Connection and Isolation Panel [NHANES]     Frequency of Communication with Friends and Family: More than three times a week     Frequency of Social Gatherings with Friends and Family: Three times a week     Attends [employer] Services: Never     Active Member of Clubs or Organizations: No     Attends Club or Organization Meetings: Never     Marital Status: Not on file   Intimate Partner Violence: Not At Risk (10/9/2023)    Humiliation, Afraid, Rape, and Kick questionnaire     Fear of Current or Ex-Partner: No     Emotionally Abused: No     Physically Abused: No     Sexually Abused: No   Housing Stability: Low Risk  (10/9/2023)    Housing Stability Vital Sign     Unable to Pay for Housing in the Last Year: No     Number of Places Lived in the Last Year: 1     Unstable Housing in the Last Year: No     Allergies   Allergen Reactions    Codeine Other     Vasovagal response    Hydromorphone Other     Vasovagals response    Ciprofloxacin Other     tendonitis     Current Outpatient Medications on File Prior to Visit   Medication Sig Dispense Refill    [DISCONTINUED] allopurinol (Zyloprim) 100 mg tablet Take 1 tablet (100 mg) by mouth once daily.      [DISCONTINUED] amoxicillin-pot clavulanate (Augmentin) 875-125 mg tablet Take 1 tablet (875 mg) by mouth 2 times a day with meals. 16 tablet 0    [DISCONTINUED] lactobacillus acidophilus (Lactobacillus acidoph-L.bulgar) tablet tablet Take 1 tablet by mouth 2 times a day with meals.      [DISCONTINUED] levothyroxine (Synthroid) 112 mcg tablet Take 1 tablet (112 mcg) by mouth once daily.      [DISCONTINUED] lisinopril 10 mg tablet Take 1 tablet (10 mg) by mouth once daily. 90 tablet 3    [DISCONTINUED] pravastatin (Pravachol) 20 mg tablet Take 1 tablet (20 mg) by mouth once daily.       [DISCONTINUED] buPROPion XL (Wellbutrin XL) 150 mg 24 hr tablet Take 1 tablet (150 mg) by mouth once daily in the morning.      [DISCONTINUED] levothyroxine (Synthroid) 88 mcg tablet Take 1 tablet (88 mcg) by mouth once daily in the morning. Take before meals.      [DISCONTINUED] ondansetron ODT (Zofran-ODT) 4 mg disintegrating tablet Take 1 tablet (4 mg) by mouth every 8 hours if needed for nausea or vomiting. (Patient not taking: Reported on 10/20/2023) 20 tablet 0     No current facility-administered medications on file prior to visit.     Immunization History   Administered Date(s) Administered    Flu vaccine, quadrivalent, high-dose, preservative free, age 65y+ (FLUZONE) 01/15/2021, 01/26/2023    Influenza, High Dose Seasonal, Preservative Free 12/27/2019    Pfizer COVID-19 vaccine, bivalent, age 12 years and older (30 mcg/0.3 mL) 11/10/2022    Pfizer Gray Cap SARS-CoV-2 04/22/2022    Pfizer Purple Cap SARS-CoV-2 02/07/2021, 02/28/2021, 09/29/2021    Tdap vaccine, age 7 year and older (BOOSTRIX) 11/16/2016     Patient's medical history was reviewed and updated either before or during this encounter.    Kirk Miller MD

## 2023-10-20 ENCOUNTER — OFFICE VISIT (OUTPATIENT)
Dept: PRIMARY CARE | Facility: CLINIC | Age: 83
End: 2023-10-20
Payer: MEDICARE

## 2023-10-20 VITALS
OXYGEN SATURATION: 97 % | SYSTOLIC BLOOD PRESSURE: 120 MMHG | DIASTOLIC BLOOD PRESSURE: 80 MMHG | TEMPERATURE: 97.6 F | WEIGHT: 163 LBS | BODY MASS INDEX: 27.98 KG/M2 | HEART RATE: 86 BPM

## 2023-10-20 DIAGNOSIS — N18.30 STAGE 3 CHRONIC KIDNEY DISEASE, UNSPECIFIED WHETHER STAGE 3A OR 3B CKD (MULTI): ICD-10-CM

## 2023-10-20 DIAGNOSIS — I10 HYPERTENSION, UNSPECIFIED TYPE: ICD-10-CM

## 2023-10-20 DIAGNOSIS — E03.9 HYPOTHYROIDISM, UNSPECIFIED TYPE: ICD-10-CM

## 2023-10-20 DIAGNOSIS — K57.92 DIVERTICULITIS: Primary | ICD-10-CM

## 2023-10-20 DIAGNOSIS — M10.9 GOUT, UNSPECIFIED CAUSE, UNSPECIFIED CHRONICITY, UNSPECIFIED SITE: ICD-10-CM

## 2023-10-20 DIAGNOSIS — I10 PRIMARY HYPERTENSION: ICD-10-CM

## 2023-10-20 DIAGNOSIS — E78.5 HYPERLIPIDEMIA, UNSPECIFIED HYPERLIPIDEMIA TYPE: ICD-10-CM

## 2023-10-20 PROCEDURE — 3074F SYST BP LT 130 MM HG: CPT | Performed by: INTERNAL MEDICINE

## 2023-10-20 PROCEDURE — 3079F DIAST BP 80-89 MM HG: CPT | Performed by: INTERNAL MEDICINE

## 2023-10-20 PROCEDURE — 1159F MED LIST DOCD IN RCRD: CPT | Performed by: INTERNAL MEDICINE

## 2023-10-20 PROCEDURE — 99214 OFFICE O/P EST MOD 30 MIN: CPT | Performed by: INTERNAL MEDICINE

## 2023-10-20 PROCEDURE — 1126F AMNT PAIN NOTED NONE PRSNT: CPT | Performed by: INTERNAL MEDICINE

## 2023-10-20 PROCEDURE — 1036F TOBACCO NON-USER: CPT | Performed by: INTERNAL MEDICINE

## 2023-10-20 PROCEDURE — 1111F DSCHRG MED/CURRENT MED MERGE: CPT | Performed by: INTERNAL MEDICINE

## 2023-10-20 PROCEDURE — 1160F RVW MEDS BY RX/DR IN RCRD: CPT | Performed by: INTERNAL MEDICINE

## 2023-10-20 RX ORDER — LEVOTHYROXINE SODIUM 88 UG/1
88 TABLET ORAL
Qty: 90 TABLET | Refills: 3 | Status: SHIPPED | OUTPATIENT
Start: 2023-10-20 | End: 2024-10-19

## 2023-10-20 RX ORDER — ALLOPURINOL 100 MG/1
100 TABLET ORAL DAILY
Start: 2023-10-20 | End: 2024-03-05

## 2023-10-20 RX ORDER — LEVOTHYROXINE SODIUM 88 UG/1
88 TABLET ORAL
COMMUNITY
End: 2023-10-20 | Stop reason: SDUPTHER

## 2023-10-20 RX ORDER — AMOXICILLIN AND CLAVULANATE POTASSIUM 875; 125 MG/1; MG/1
875 TABLET, FILM COATED ORAL
Qty: 16 TABLET | Refills: 0
Start: 2023-10-20 | End: 2024-01-16 | Stop reason: ENTERED-IN-ERROR

## 2023-10-20 RX ORDER — PRAVASTATIN SODIUM 20 MG/1
20 TABLET ORAL DAILY
Start: 2023-10-20

## 2023-10-20 RX ORDER — LISINOPRIL 10 MG/1
10 TABLET ORAL DAILY
Qty: 90 TABLET | Refills: 3
Start: 2023-10-20 | End: 2024-01-18 | Stop reason: SDUPTHER

## 2023-10-20 ASSESSMENT — ENCOUNTER SYMPTOMS
DEPRESSION: 0
OCCASIONAL FEELINGS OF UNSTEADINESS: 0
LOSS OF SENSATION IN FEET: 0

## 2023-10-20 ASSESSMENT — PATIENT HEALTH QUESTIONNAIRE - PHQ9
1. LITTLE INTEREST OR PLEASURE IN DOING THINGS: NOT AT ALL
2. FEELING DOWN, DEPRESSED OR HOPELESS: NOT AT ALL
SUM OF ALL RESPONSES TO PHQ9 QUESTIONS 1 AND 2: 0

## 2023-10-20 ASSESSMENT — PAIN SCALES - GENERAL: PAINLEVEL: 0-NO PAIN

## 2023-10-21 NOTE — DOCUMENTATION CLARIFICATION NOTE
PATIENT:               JEFF ROBB  ACCT #:                  [id number]  MRN:                       [id number]  :                       1940  ADMIT DATE:       10/9/2023 4:56 AM  DISCH DATE:        10/11/2023 1:46 PM  RESPONDING PROVIDER #:        [id number]          PROVIDER RESPONSE TEXT:    Sepsis was a differential diagnosis and ruled out after study    CDI QUERY TEXT:    [facility]_CV Sepsis      Instruction:  Based on your assessment of the patient and the clinical information, please provide the requested documentation by clicking on the appropriate radio button and enter any additional information if prompted.    Question: Sepsis was documented in the medical record. Based on the documentation and the clinical information, can the diagnosis be further clarified as    When answering this query, please exercise your independent professional judgment. The fact that a question is being asked, does not imply that any particular answer is desired or expected.    The patient's clinical indicators include:  Clinical Information: Jeff Robb is a 83 y.o. female presenting with abdominal pain. Patient was her normal state of health going to sleep last night, waking up middle the night with sharp bandlike abdominal pain in the right and left lower  quadrants. This is associated with non-bloody diarrhea. No emesis. No fevers or chills.    Documented Diagnosis: Acute diverticulitis with sepsis    Clinical Indicators:  -Vital Signs: 36.5c, 86 hr, 16 rr, 87/75, 98 % ra  -WBC: 21.9 10/09  -Microbiology Results: Urine culture: no significant growth  -Lactic acid: 2.1 10/09  -BUN/Creat: 20/ 1.3  -Blood cultures: No Growth  -Bilirubin: 0.5  -Platelets: 442    -Other clinical indicators:  Progress note 10/10/2023 Diverticulitis with sepsis Dr. Cain    Treatment: IV Zosyn, IV 0.9% ns fluid bolus 1 liter x 2, IV 0.9%ns at 100 ml/hr    Risk Factors: history of diverticulitis   Options provided:  -- Sepsis was a  differential diagnosis and ruled out after study  -- Sepsis with other organ dysfunction, Please specify additional information below  -- Other - I will add my own diagnosis  -- Refer to Clinical Documentation Reviewer    Query created by: Carlos Vela on 10/16/2023 11:05 AM      Electronically signed by:  ANGY CHAMBERLAIN DO 10/21/2023 11:36 AM

## 2023-10-23 ENCOUNTER — PATIENT OUTREACH (OUTPATIENT)
Dept: PRIMARY CARE | Facility: CLINIC | Age: 83
End: 2023-10-23
Payer: MEDICARE

## 2023-10-23 NOTE — PROGRESS NOTES
Unable to reach patient for call back after patient's follow up appointment with PCP.   LVM with call back number for patient to call if needed

## 2023-11-09 ENCOUNTER — PATIENT OUTREACH (OUTPATIENT)
Dept: PRIMARY CARE | Facility: CLINIC | Age: 83
End: 2023-11-09
Payer: MEDICARE

## 2023-11-09 NOTE — PROGRESS NOTES
Unable to reach patient for one month post discharge follow up call.   LVM with call back number for patient to call if needed

## 2024-01-08 ENCOUNTER — PATIENT OUTREACH (OUTPATIENT)
Dept: PRIMARY CARE | Facility: CLINIC | Age: 84
End: 2024-01-08
Payer: MEDICARE

## 2024-01-08 NOTE — PROGRESS NOTES
Patient has met target of no readmission for (90) days post (hospital, SNF, rehab) discharge and is graduated from Transitional Care Management program at this time.

## 2024-01-12 DIAGNOSIS — I10 HYPERTENSION, UNSPECIFIED TYPE: Primary | ICD-10-CM

## 2024-01-12 RX ORDER — LISINOPRIL 40 MG/1
40 TABLET ORAL DAILY
Qty: 90 TABLET | Refills: 0 | Status: SHIPPED | OUTPATIENT
Start: 2024-01-12 | End: 2024-01-16 | Stop reason: ENTERED-IN-ERROR

## 2024-01-16 NOTE — PROGRESS NOTES
[facility]: [facility]  PROGRESS NOTE      Marzena Robb is a 84 y.o. female that is presenting today for Follow-up (6 month follow up).    Assessment/Plan   Diagnoses and all orders for this visit:  Hypertension, unspecified type  -     CBC and Auto Differential; Future  -     Comprehensive Metabolic Panel; Future  -     Lipid Panel; Future  -     TSH with reflex to Free T4 if abnormal; Future  -     lisinopril 10 mg tablet; Take 1 tablet (10 mg) by mouth once daily.  Hyperlipidemia, unspecified hyperlipidemia type  Stage 3 chronic kidney disease, unspecified whether stage 3a or 3b CKD (CMS/[facility])  Hypothyroidism, unspecified type  Gout, unspecified cause, unspecified chronicity, unspecified site  Encounter for routine laboratory testing  -     CBC and Auto Differential; Future  -     Comprehensive Metabolic Panel; Future  -     Lipid Panel; Future  -     Hemoglobin A1C; Future  -     Vitamin D 25-Hydroxy,Total (for eval of Vitamin D levels); Future  -     TSH with reflex to Free T4 if abnormal; Future  History of anemia  -     CBC and Auto Differential; Future  Vitamin D deficiency  -     Vitamin D 25-Hydroxy,Total (for eval of Vitamin D levels); Future  Vasomotor rhinitis  -     ipratropium (Atrovent) 42 mcg (0.06 %) nasal spray; Administer 2 sprays into each nostril 4 times a day.  Hyperglycemia  -     Hemoglobin A1C; Future  Other orders  -     Follow Up In Primary Care - Medicare Annual; Future  -     Pneumococcal conjugate vaccine, 20-valent (PREVNAR 20)    BP stable on reduced regimen.  Lipids stable on statin - check FLP in 6 months.  Clinically and chemically euthyroid.  No issues w/ gout - cont allopurinol.    Subjective   HPI  No issues w/ diverticulosis.  Overall feels well and has no complaints.  No CP/SOB/etc.    Review of Systems   Constitutional:  Negative for fever.   Respiratory:  Negative for shortness of breath.    Cardiovascular:  Negative for chest pain.  "  Gastrointestinal:  Negative for abdominal pain.   All other systems reviewed and are negative.     Objective   Vitals:    01/18/24 1258   BP: 128/72   Pulse: 68   Temp: 36.6 °C (97.8 °F)      Body mass index is 27.79 kg/m².  Physical Exam  Vitals reviewed.   Constitutional:       Appearance: Normal appearance.   Cardiovascular:      Rate and Rhythm: Normal rate and regular rhythm.      Heart sounds: No murmur heard.  Pulmonary:      Breath sounds: Normal breath sounds. No wheezing, rhonchi or rales.   Musculoskeletal:      Right lower leg: No edema.      Left lower leg: No edema.       Diagnostic Results   Lab Results   Component Value Date    GLUCOSE 94 10/11/2023    CALCIUM 8.8 10/11/2023     10/11/2023    K 4.7 10/11/2023    CO2 23 (L) 10/11/2023     (H) 10/11/2023    BUN 11 10/11/2023    CREATININE 1.30 10/11/2023     Lab Results   Component Value Date    ALT 20 10/09/2023    AST 19 10/09/2023    ALKPHOS 94 10/09/2023    BILITOT 0.5 10/09/2023     Lab Results   Component Value Date    WBC 8.6 10/11/2023    HGB 11.3 (L) 10/11/2023    HCT 34.3 (L) 10/11/2023    MCV 90 10/11/2023     10/11/2023     Lab Results   Component Value Date    CHOL 213 (H) 07/25/2023    CHOL 206 (H) 07/22/2022    CHOL 215 (H) 07/15/2021     Lab Results   Component Value Date    HDL 62 07/25/2023    HDL 56 07/22/2022    HDL 61 07/15/2021     Lab Results   Component Value Date    LDLCALC 129 07/25/2023    LDLCALC 127 07/22/2022    LDLCALC 125 07/15/2021     Lab Results   Component Value Date    TRIG 112 07/25/2023    TRIG 117 07/22/2022    TRIG 147 07/15/2021     No components found for: \"CHOLHDL\"  Lab Results   Component Value Date    HGBA1C 5.7 07/25/2023     Other labs not included in the list above were reviewed either before or during this encounter.    History    Past Medical History:   Diagnosis Date    Disease of thyroid gland     Diverticulitis 10/09/2023    Hypertension     Osteoarthritis 09/06/2023    Stage 3 " chronic kidney disease (CMS/HCC) 10/18/2023     Past Surgical History:   Procedure Laterality Date    FRACTURE SURGERY       Family History   Problem Relation Name Age of Onset    No Known Problems Mother      Cancer Father       Social History     Socioeconomic History    Marital status:      Spouse name: Not on file    Number of children: Not on file    Years of education: Not on file    Highest education level: Not on file   Occupational History    Not on file   Tobacco Use    Smoking status: Never    Smokeless tobacco: Never   Vaping Use    Vaping Use: Never used   Substance and Sexual Activity    Alcohol use: Not Currently    Drug use: Never    Sexual activity: Not on file   Other Topics Concern    Not on file   Social History Narrative    Not on file     Social Determinants of Health     Financial Resource Strain: Low Risk  (10/9/2023)    Overall Financial Resource Strain (CARDIA)     Difficulty of Paying Living Expenses: Not hard at all   Food Insecurity: No Food Insecurity (10/9/2023)    Hunger Vital Sign     Worried About Running Out of Food in the Last Year: Never true     Ran Out of Food in the Last Year: Never true   Transportation Needs: No Transportation Needs (10/9/2023)    PRAPARE - Transportation     Lack of Transportation (Medical): No     Lack of Transportation (Non-Medical): No   Physical Activity: Inactive (10/9/2023)    Exercise Vital Sign     Days of Exercise per Week: 0 days     Minutes of Exercise per Session: 0 min   Stress: No Stress Concern Present (10/9/2023)    [employer] [place] [employer] - Occupational Stress Questionnaire     Feeling of Stress : Not at all   Social Connections: Unknown (10/9/2023)    Social Connection and Isolation Panel [NHANES]     Frequency of Communication with Friends and Family: More than three times a week     Frequency of Social Gatherings with Friends and Family: Three times a week     Attends [employer] Services: Never     Active Member of  Clubs or Organizations: No     Attends Club or Organization Meetings: Never     Marital Status: Not on file   Intimate Partner Violence: Not At Risk (10/9/2023)    Humiliation, Afraid, Rape, and Kick questionnaire     Fear of Current or Ex-Partner: No     Emotionally Abused: No     Physically Abused: No     Sexually Abused: No   Housing Stability: Low Risk  (10/9/2023)    Housing Stability Vital Sign     Unable to Pay for Housing in the Last Year: No     Number of Places Lived in the Last Year: 1     Unstable Housing in the Last Year: No     Allergies   Allergen Reactions    Codeine Other     Vasovagal response    Hydromorphone Other     Vasovagals response    Ciprofloxacin Other     tendonitis     Current Outpatient Medications on File Prior to Visit   Medication Sig Dispense Refill    allopurinol (Zyloprim) 100 mg tablet Take 1 tablet (100 mg) by mouth once daily.      levothyroxine (Synthroid) 88 mcg tablet Take 1 tablet (88 mcg) by mouth once daily in the morning. Take before meals. 90 tablet 3    pravastatin (Pravachol) 20 mg tablet Take 1 tablet (20 mg) by mouth once daily.      [DISCONTINUED] lisinopril 10 mg tablet Take 1 tablet (10 mg) by mouth once daily. 90 tablet 3    [DISCONTINUED] amoxicillin-pot clavulanate (Augmentin) 875-125 mg tablet Take 1 tablet (875 mg) by mouth 2 times a day with meals. 16 tablet 0    [DISCONTINUED] lisinopril 40 mg tablet TAKE ONE TABLET BY MOUTH once DAILY 90 tablet 0     No current facility-administered medications on file prior to visit.     Immunization History   Administered Date(s) Administered    Flu vaccine, quadrivalent, high-dose, preservative free, age 65y+ (FLUZONE) 01/15/2021, 01/26/2023    Influenza, High Dose Seasonal, Preservative Free 12/27/2019    Pfizer COVID-19 vaccine, bivalent, age 12 years and older (30 mcg/0.3 mL) 11/10/2022    Pfizer Gray Cap SARS-CoV-2 04/22/2022    Pfizer Purple Cap SARS-CoV-2 02/07/2021, 02/28/2021, 09/29/2021    Pneumococcal  conjugate vaccine, 20-valent (PREVNAR 20) 01/18/2024    Tdap vaccine, age 7 year and older (BOOSTRIX) 11/16/2016     Patient's medical history was reviewed and updated either before or during this encounter.       Kirk Miller MD

## 2024-01-18 ENCOUNTER — OFFICE VISIT (OUTPATIENT)
Dept: PRIMARY CARE | Facility: CLINIC | Age: 84
End: 2024-01-18
Payer: MEDICARE

## 2024-01-18 VITALS
SYSTOLIC BLOOD PRESSURE: 128 MMHG | WEIGHT: 162 LBS | DIASTOLIC BLOOD PRESSURE: 72 MMHG | HEIGHT: 64 IN | HEART RATE: 68 BPM | TEMPERATURE: 97.8 F | BODY MASS INDEX: 27.66 KG/M2

## 2024-01-18 DIAGNOSIS — Z86.2 HISTORY OF ANEMIA: ICD-10-CM

## 2024-01-18 DIAGNOSIS — Z01.89 ENCOUNTER FOR ROUTINE LABORATORY TESTING: ICD-10-CM

## 2024-01-18 DIAGNOSIS — R73.9 HYPERGLYCEMIA: ICD-10-CM

## 2024-01-18 DIAGNOSIS — N18.30 STAGE 3 CHRONIC KIDNEY DISEASE, UNSPECIFIED WHETHER STAGE 3A OR 3B CKD (MULTI): ICD-10-CM

## 2024-01-18 DIAGNOSIS — J30.0 VASOMOTOR RHINITIS: ICD-10-CM

## 2024-01-18 DIAGNOSIS — I10 HYPERTENSION, UNSPECIFIED TYPE: Primary | ICD-10-CM

## 2024-01-18 DIAGNOSIS — E03.9 HYPOTHYROIDISM, UNSPECIFIED TYPE: ICD-10-CM

## 2024-01-18 DIAGNOSIS — E78.5 HYPERLIPIDEMIA, UNSPECIFIED HYPERLIPIDEMIA TYPE: ICD-10-CM

## 2024-01-18 DIAGNOSIS — E55.9 VITAMIN D DEFICIENCY: ICD-10-CM

## 2024-01-18 DIAGNOSIS — M10.9 GOUT, UNSPECIFIED CAUSE, UNSPECIFIED CHRONICITY, UNSPECIFIED SITE: ICD-10-CM

## 2024-01-18 PROCEDURE — 90677 PCV20 VACCINE IM: CPT | Performed by: INTERNAL MEDICINE

## 2024-01-18 PROCEDURE — 99214 OFFICE O/P EST MOD 30 MIN: CPT | Performed by: INTERNAL MEDICINE

## 2024-01-18 PROCEDURE — 1126F AMNT PAIN NOTED NONE PRSNT: CPT | Performed by: INTERNAL MEDICINE

## 2024-01-18 PROCEDURE — 1160F RVW MEDS BY RX/DR IN RCRD: CPT | Performed by: INTERNAL MEDICINE

## 2024-01-18 PROCEDURE — 1036F TOBACCO NON-USER: CPT | Performed by: INTERNAL MEDICINE

## 2024-01-18 PROCEDURE — 3078F DIAST BP <80 MM HG: CPT | Performed by: INTERNAL MEDICINE

## 2024-01-18 PROCEDURE — 1159F MED LIST DOCD IN RCRD: CPT | Performed by: INTERNAL MEDICINE

## 2024-01-18 PROCEDURE — 3074F SYST BP LT 130 MM HG: CPT | Performed by: INTERNAL MEDICINE

## 2024-01-18 RX ORDER — LISINOPRIL 10 MG/1
10 TABLET ORAL DAILY
Qty: 90 TABLET | Refills: 3 | Status: SHIPPED | OUTPATIENT
Start: 2024-01-18 | End: 2025-01-17

## 2024-01-18 RX ORDER — IPRATROPIUM BROMIDE 42 UG/1
2 SPRAY, METERED NASAL 4 TIMES DAILY
Qty: 15 ML | Refills: 0 | Status: SHIPPED | OUTPATIENT
Start: 2024-01-18 | End: 2025-01-17

## 2024-01-18 ASSESSMENT — ENCOUNTER SYMPTOMS
SHORTNESS OF BREATH: 0
ABDOMINAL PAIN: 0
FEVER: 0

## 2024-01-18 ASSESSMENT — PAIN SCALES - GENERAL: PAINLEVEL: 0-NO PAIN

## 2024-03-05 DIAGNOSIS — M10.9 GOUT, UNSPECIFIED CAUSE, UNSPECIFIED CHRONICITY, UNSPECIFIED SITE: ICD-10-CM

## 2024-03-05 RX ORDER — ALLOPURINOL 100 MG/1
100 TABLET ORAL DAILY
Qty: 90 TABLET | Refills: 3 | Status: SHIPPED | OUTPATIENT
Start: 2024-03-05

## 2024-07-22 ENCOUNTER — APPOINTMENT (OUTPATIENT)
Dept: PRIMARY CARE | Facility: CLINIC | Age: 84
End: 2024-07-22
Payer: MEDICARE

## 2024-07-23 ENCOUNTER — LAB (OUTPATIENT)
Dept: LAB | Facility: LAB | Age: 84
End: 2024-07-23
Payer: MEDICARE

## 2024-07-23 DIAGNOSIS — Z01.89 ENCOUNTER FOR ROUTINE LABORATORY TESTING: ICD-10-CM

## 2024-07-23 DIAGNOSIS — E55.9 VITAMIN D DEFICIENCY: ICD-10-CM

## 2024-07-23 DIAGNOSIS — Z86.2 HISTORY OF ANEMIA: ICD-10-CM

## 2024-07-23 DIAGNOSIS — R73.9 HYPERGLYCEMIA: ICD-10-CM

## 2024-07-23 DIAGNOSIS — I10 HYPERTENSION, UNSPECIFIED TYPE: ICD-10-CM

## 2024-07-23 LAB
25(OH)D3 SERPL-MCNC: 92 NG/ML (ref 31–100)
ALBUMIN SERPL-MCNC: 4.4 G/DL (ref 3.5–5)
ALP BLD-CCNC: 87 U/L (ref 35–125)
ALT SERPL-CCNC: 12 U/L (ref 5–40)
ANION GAP SERPL CALC-SCNC: 7 MMOL/L
AST SERPL-CCNC: 18 U/L (ref 5–40)
BASOPHILS # BLD AUTO: 0.1 X10*3/UL (ref 0–0.1)
BASOPHILS NFR BLD AUTO: 1.2 %
BILIRUB SERPL-MCNC: 0.6 MG/DL (ref 0.1–1.2)
BUN SERPL-MCNC: 19 MG/DL (ref 8–25)
CALCIUM SERPL-MCNC: 10 MG/DL (ref 8.5–10.4)
CHLORIDE SERPL-SCNC: 105 MMOL/L (ref 97–107)
CHOLEST SERPL-MCNC: 204 MG/DL (ref 133–200)
CHOLEST/HDLC SERPL: 3.3 {RATIO}
CO2 SERPL-SCNC: 29 MMOL/L (ref 24–31)
CREAT SERPL-MCNC: 1.1 MG/DL (ref 0.4–1.6)
EGFRCR SERPLBLD CKD-EPI 2021: 50 ML/MIN/1.73M*2
EOSINOPHIL # BLD AUTO: 0.47 X10*3/UL (ref 0–0.4)
EOSINOPHIL NFR BLD AUTO: 5.6 %
ERYTHROCYTE [DISTWIDTH] IN BLOOD BY AUTOMATED COUNT: 13.3 % (ref 11.5–14.5)
EST. AVERAGE GLUCOSE BLD GHB EST-MCNC: 114 MG/DL
GLUCOSE SERPL-MCNC: 95 MG/DL (ref 65–99)
HBA1C MFR BLD: 5.6 %
HCT VFR BLD AUTO: 41.9 % (ref 36–46)
HDLC SERPL-MCNC: 62 MG/DL
HGB BLD-MCNC: 13.3 G/DL (ref 12–16)
IMM GRANULOCYTES # BLD AUTO: 0.03 X10*3/UL (ref 0–0.5)
IMM GRANULOCYTES NFR BLD AUTO: 0.4 % (ref 0–0.9)
LDLC SERPL CALC-MCNC: 124 MG/DL (ref 65–130)
LYMPHOCYTES # BLD AUTO: 1.73 X10*3/UL (ref 0.8–3)
LYMPHOCYTES NFR BLD AUTO: 20.8 %
MCH RBC QN AUTO: 28.5 PG (ref 26–34)
MCHC RBC AUTO-ENTMCNC: 31.7 G/DL (ref 32–36)
MCV RBC AUTO: 90 FL (ref 80–100)
MONOCYTES # BLD AUTO: 0.7 X10*3/UL (ref 0.05–0.8)
MONOCYTES NFR BLD AUTO: 8.4 %
NEUTROPHILS # BLD AUTO: 5.29 X10*3/UL (ref 1.6–5.5)
NEUTROPHILS NFR BLD AUTO: 63.6 %
NRBC BLD-RTO: 0 /100 WBCS (ref 0–0)
PLATELET # BLD AUTO: 337 X10*3/UL (ref 150–450)
POTASSIUM SERPL-SCNC: 5.5 MMOL/L (ref 3.4–5.1)
PROT SERPL-MCNC: 6.7 G/DL (ref 5.9–7.9)
RBC # BLD AUTO: 4.67 X10*6/UL (ref 4–5.2)
SODIUM SERPL-SCNC: 141 MMOL/L (ref 133–145)
T4 FREE SERPL-MCNC: 1.8 NG/DL (ref 0.9–1.7)
TRIGL SERPL-MCNC: 91 MG/DL (ref 40–150)
TSH SERPL DL<=0.05 MIU/L-ACNC: 0.24 MIU/L (ref 0.27–4.2)
WBC # BLD AUTO: 8.3 X10*3/UL (ref 4.4–11.3)

## 2024-07-23 PROCEDURE — 36415 COLL VENOUS BLD VENIPUNCTURE: CPT

## 2024-07-23 PROCEDURE — 82306 VITAMIN D 25 HYDROXY: CPT

## 2024-07-23 PROCEDURE — 80053 COMPREHEN METABOLIC PANEL: CPT

## 2024-07-23 PROCEDURE — 84443 ASSAY THYROID STIM HORMONE: CPT

## 2024-07-23 PROCEDURE — 80061 LIPID PANEL: CPT

## 2024-07-23 PROCEDURE — 83036 HEMOGLOBIN GLYCOSYLATED A1C: CPT

## 2024-07-23 PROCEDURE — 85025 COMPLETE CBC W/AUTO DIFF WBC: CPT

## 2024-07-23 PROCEDURE — 84439 ASSAY OF FREE THYROXINE: CPT

## 2024-07-23 ASSESSMENT — ENCOUNTER SYMPTOMS
DIARRHEA: 0
COUGH: 0
CHILLS: 0
APPETITE CHANGE: 0
SHORTNESS OF BREATH: 0
FEVER: 0
HEADACHES: 0
VOMITING: 0
NAUSEA: 0
ABDOMINAL PAIN: 0

## 2024-07-23 NOTE — PROGRESS NOTES
[facility]: [facility]  MEDICARE WELLNESS EXAM      Marzena Robb is a 84 y.o. female that is presenting today for Follow-up.    Assessment/Plan    Diagnoses and all orders for this visit:  Annual physical exam  Hypertension, unspecified type  -     Potassium; Future  Hyperlipidemia, unspecified hyperlipidemia type  Stage 3 chronic kidney disease, unspecified whether stage 3a or 3b CKD (Multi)  Hypothyroidism, unspecified type  -     levothyroxine (Synthroid, Levoxyl) 75 mcg tablet; Take 1 tablet (75 mcg) by mouth early in the morning.. Take on an empty stomach at the same time each day, either 30 to 60 minutes prior to breakfast  -     Tsh With Reflex To Free T4 If Abnormal; Future  Vitamin D deficiency  Mixed hyperlipidemia  Hyperglycemia  Other orders  -     Follow Up In Primary Care - Medicare Annual  -     Follow Up In Primary Care - Established; Future    Would like to reduce levothyroxine.  Noted the hyperkalemia.  Discussed dietary sources of potassium.  Would like to recheck the potassium level and we rechecked the TSH.  No issues with gout, continue allopurinol and the blood pressure stable on lisinopril which is probably the only potential offending medication in terms of the potassium.  Continue pravastatin as is.    ADVANCED CARE PLANNING  Advanced Care Planning was discussed with patient:  The patient has an active advanced care plan on file. The patient has an active surrogate decision-maker on file.  Encouraged the patient to confirm that Living Will and Healthcare Power of  (HCPoA) are accurate and up to date.  Encouraged the patient to confirm that our office be provided a copy of any documentation in the event that anything changes.    ACTIVITIES OF DAILY LIVING  Basic ADLs:  Bathing: Independent, Dressing: Independent, Toileting: Independent, Transferring: Independent, Continence: Independent, Feeding: Independent.    Instrumental ADLs:  Ability to use phone:  Independent, Shopping: Independent, Cooking: Independent, House-keeping: Independent, Laundry: Independent, Transportation: Independent, Medication Management: Independent, Finance Management: Independent.    Subjective   HPI  This patient presents today for annual physical, Medicare wellness exam.  Discussed screening/prevention, healthy lifestyle and code status.   Reviewed the patient's wishing regarding decision making.    Review of Systems   Constitutional:  Negative for appetite change, chills and fever.   Respiratory:  Negative for cough and shortness of breath.    Cardiovascular:  Negative for chest pain.   Gastrointestinal:  Negative for abdominal pain, diarrhea, nausea and vomiting.   Neurological:  Negative for headaches.   All other systems reviewed and are negative.    Objective   Vitals:    07/25/24 1326   BP: 130/80   Pulse: 73   Temp: 35 °C (95 °F)   SpO2: 99%      Body mass index is 28.49 kg/m².  Physical Exam  Vitals reviewed.   Constitutional:       General: She is not in acute distress.     Appearance: She is not toxic-appearing.   HENT:      Head: Normocephalic and atraumatic.      Mouth/Throat:      Mouth: Mucous membranes are moist.   Eyes:      Pupils: Pupils are equal, round, and reactive to light.   Cardiovascular:      Rate and Rhythm: Normal rate and regular rhythm.      Heart sounds: No murmur heard.  Pulmonary:      Breath sounds: Normal breath sounds. No wheezing, rhonchi or rales.   Abdominal:      General: There is no distension.      Palpations: Abdomen is soft.   Musculoskeletal:      Right lower leg: No edema.      Left lower leg: No edema.   Neurological:      General: No focal deficit present.      Mental Status: She is alert and oriented to person, place, and time.       Diagnostic Results   Lab Results   Component Value Date    GLUCOSE 95 07/23/2024    CALCIUM 10.0 07/23/2024     07/23/2024    K 5.5 (H) 07/23/2024    CO2 29 07/23/2024     07/23/2024    BUN 19  "07/23/2024    CREATININE 1.10 07/23/2024     Lab Results   Component Value Date    ALT 12 07/23/2024    AST 18 07/23/2024    ALKPHOS 87 07/23/2024    BILITOT 0.6 07/23/2024     Lab Results   Component Value Date    WBC 8.3 07/23/2024    HGB 13.3 07/23/2024    HCT 41.9 07/23/2024    MCV 90 07/23/2024     07/23/2024     Lab Results   Component Value Date    CHOL 204 (H) 07/23/2024    CHOL 213 (H) 07/25/2023    CHOL 206 (H) 07/22/2022     Lab Results   Component Value Date    HDL 62.0 07/23/2024    HDL 62 07/25/2023    HDL 56 07/22/2022     Lab Results   Component Value Date    LDLCALC 124 07/23/2024    LDLCALC 129 07/25/2023    LDLCALC 127 07/22/2022     Lab Results   Component Value Date    TRIG 91 07/23/2024    TRIG 112 07/25/2023    TRIG 117 07/22/2022     No components found for: \"CHOLHDL\"  Lab Results   Component Value Date    HGBA1C 5.6 07/23/2024     Other labs not included in the list above reviewed either before or during this encounter.    History   Past Medical History:   Diagnosis Date    Disease of thyroid gland     Diverticulitis 10/09/2023    HL (hearing loss)     Hypertension     Osteoarthritis 09/06/2023    Stage 3 chronic kidney disease (Multi) 10/18/2023     Past Surgical History:   Procedure Laterality Date    FRACTURE SURGERY      TONSILLECTOMY      WISDOM TOOTH EXTRACTION       Family History   Problem Relation Name Age of Onset    No Known Problems Mother      Cancer Father Pastor      Social History     Socioeconomic History    Marital status:      Spouse name: Not on file    Number of children: Not on file    Years of education: Not on file    Highest education level: Not on file   Occupational History    Not on file   Tobacco Use    Smoking status: Never    Smokeless tobacco: Never   Vaping Use    Vaping status: Never Used   Substance and Sexual Activity    Alcohol use: Yes     Comment: Social use    Drug use: Never    Sexual activity: Not Currently   Other Topics Concern    Not " on file   Social History Narrative    Not on file     Social Determinants of Health     Financial Resource Strain: Low Risk  (10/9/2023)    Overall Financial Resource Strain (CARDIA)     Difficulty of Paying Living Expenses: Not hard at all   Food Insecurity: No Food Insecurity (10/9/2023)    Hunger Vital Sign     Worried About Running Out of Food in the Last Year: Never true     Ran Out of Food in the Last Year: Never true   Transportation Needs: No Transportation Needs (10/9/2023)    PRAPARE - Transportation     Lack of Transportation (Medical): No     Lack of Transportation (Non-Medical): No   Physical Activity: Inactive (10/9/2023)    Exercise Vital Sign     Days of Exercise per Week: 0 days     Minutes of Exercise per Session: 0 min   Stress: No Stress Concern Present (10/9/2023)    [employer] [place] [employer] - Occupational Stress Questionnaire     Feeling of Stress : Not at all   Social Connections: Unknown (10/9/2023)    Social Connection and Isolation Panel [NHANES]     Frequency of Communication with Friends and Family: More than three times a week     Frequency of Social Gatherings with Friends and Family: Three times a week     Attends Alevism Services: Never     Active Member of Clubs or Organizations: No     Attends Club or Organization Meetings: Never     Marital Status: Not on file   Intimate Partner Violence: Not At Risk (10/9/2023)    Humiliation, Afraid, Rape, and Kick questionnaire     Fear of Current or Ex-Partner: No     Emotionally Abused: No     Physically Abused: No     Sexually Abused: No   Housing Stability: Low Risk  (10/9/2023)    Housing Stability Vital Sign     Unable to Pay for Housing in the Last Year: No     Number of Places Lived in the Last Year: 1     Unstable Housing in the Last Year: No     Allergies   Allergen Reactions    Codeine Other     Vasovagal response    Hydromorphone Other     Vasovagals response    Ciprofloxacin Other     tendonitis     Current  Outpatient Medications on File Prior to Visit   Medication Sig Dispense Refill    allopurinol (Zyloprim) 100 mg tablet TAKE 1 TABLET BY MOUTH ONCE A DAY 90 tablet 3    ipratropium (Atrovent) 42 mcg (0.06 %) nasal spray Administer 2 sprays into each nostril 4 times a day. 15 mL 0    lisinopril 10 mg tablet Take 1 tablet (10 mg) by mouth once daily. 90 tablet 3    pravastatin (Pravachol) 20 mg tablet Take 1 tablet (20 mg) by mouth once daily.      [DISCONTINUED] levothyroxine (Synthroid) 88 mcg tablet Take 1 tablet (88 mcg) by mouth once daily in the morning. Take before meals. 90 tablet 3    [DISCONTINUED] methylPREDNISolone (Medrol, Ron,) 4 mg tablets Follow schedule on package instructions (Patient not taking: Reported on 7/25/2024) 21 tablet 0     No current facility-administered medications on file prior to visit.     Immunization History   Administered Date(s) Administered    Flu vaccine, quadrivalent, high-dose, preservative free, age 65y+ (FLUZONE) 01/15/2021, 01/26/2023    Influenza, High Dose Seasonal, Preservative Free 12/27/2019    Pfizer COVID-19 vaccine, Fall 2023, 12 years and older, (30mcg/0.3mL) 12/08/2023    Pfizer COVID-19 vaccine, bivalent, age 12 years and older (30 mcg/0.3 mL) 11/10/2022    Pfizer Gray Cap SARS-CoV-2 04/22/2022    Pfizer Purple Cap SARS-CoV-2 02/07/2021, 02/28/2021, 09/29/2021    Pneumococcal conjugate vaccine, 20-valent (PREVNAR 20) 01/18/2024    Tdap vaccine, age 7 year and older (BOOSTRIX, ADACEL) 11/16/2016     Patient's medical history was reviewed and updated either before or during this encounter.     Kirk Miller MD

## 2024-07-25 ENCOUNTER — OFFICE VISIT (OUTPATIENT)
Dept: PRIMARY CARE | Facility: CLINIC | Age: 84
End: 2024-07-25
Payer: MEDICARE

## 2024-07-25 VITALS
HEART RATE: 73 BPM | OXYGEN SATURATION: 99 % | BODY MASS INDEX: 28.34 KG/M2 | WEIGHT: 166 LBS | SYSTOLIC BLOOD PRESSURE: 130 MMHG | TEMPERATURE: 95 F | DIASTOLIC BLOOD PRESSURE: 80 MMHG | HEIGHT: 64 IN

## 2024-07-25 DIAGNOSIS — R73.9 HYPERGLYCEMIA: ICD-10-CM

## 2024-07-25 DIAGNOSIS — E03.9 HYPOTHYROIDISM, UNSPECIFIED TYPE: ICD-10-CM

## 2024-07-25 DIAGNOSIS — E78.5 HYPERLIPIDEMIA, UNSPECIFIED HYPERLIPIDEMIA TYPE: ICD-10-CM

## 2024-07-25 DIAGNOSIS — N18.30 STAGE 3 CHRONIC KIDNEY DISEASE, UNSPECIFIED WHETHER STAGE 3A OR 3B CKD (MULTI): ICD-10-CM

## 2024-07-25 DIAGNOSIS — E55.9 VITAMIN D DEFICIENCY: ICD-10-CM

## 2024-07-25 DIAGNOSIS — E78.2 MIXED HYPERLIPIDEMIA: ICD-10-CM

## 2024-07-25 DIAGNOSIS — Z00.00 ANNUAL PHYSICAL EXAM: Primary | ICD-10-CM

## 2024-07-25 DIAGNOSIS — I10 HYPERTENSION, UNSPECIFIED TYPE: ICD-10-CM

## 2024-07-25 PROCEDURE — 99215 OFFICE O/P EST HI 40 MIN: CPT | Performed by: INTERNAL MEDICINE

## 2024-07-25 PROCEDURE — 1159F MED LIST DOCD IN RCRD: CPT | Performed by: INTERNAL MEDICINE

## 2024-07-25 PROCEDURE — 1126F AMNT PAIN NOTED NONE PRSNT: CPT | Performed by: INTERNAL MEDICINE

## 2024-07-25 PROCEDURE — 3075F SYST BP GE 130 - 139MM HG: CPT | Performed by: INTERNAL MEDICINE

## 2024-07-25 PROCEDURE — G0439 PPPS, SUBSEQ VISIT: HCPCS | Performed by: INTERNAL MEDICINE

## 2024-07-25 PROCEDURE — 3079F DIAST BP 80-89 MM HG: CPT | Performed by: INTERNAL MEDICINE

## 2024-07-25 PROCEDURE — 1036F TOBACCO NON-USER: CPT | Performed by: INTERNAL MEDICINE

## 2024-07-25 RX ORDER — LEVOTHYROXINE SODIUM 75 UG/1
75 TABLET ORAL DAILY
Qty: 30 TABLET | Refills: 11 | Status: SHIPPED | OUTPATIENT
Start: 2024-07-25 | End: 2025-07-25

## 2024-07-25 ASSESSMENT — LIFESTYLE VARIABLES
HOW OFTEN DO YOU HAVE SIX OR MORE DRINKS ON ONE OCCASION: NEVER
HAS A RELATIVE, FRIEND, DOCTOR, OR ANOTHER HEALTH PROFESSIONAL EXPRESSED CONCERN ABOUT YOUR DRINKING OR SUGGESTED YOU CUT DOWN: NO
AUDIT-C TOTAL SCORE: 0
HOW OFTEN DURING THE LAST YEAR HAVE YOU FAILED TO DO WHAT WAS NORMALLY EXPECTED FROM YOU BECAUSE OF DRINKING: NEVER
SKIP TO QUESTIONS 9-10: 1
HAVE YOU OR SOMEONE ELSE BEEN INJURED AS A RESULT OF YOUR DRINKING: NO
HOW OFTEN DURING THE LAST YEAR HAVE YOU HAD A FEELING OF GUILT OR REMORSE AFTER DRINKING: NEVER
HOW OFTEN DO YOU HAVE A DRINK CONTAINING ALCOHOL: NEVER
HOW OFTEN DURING THE LAST YEAR HAVE YOU BEEN UNABLE TO REMEMBER WHAT HAPPENED THE NIGHT BEFORE BECAUSE YOU HAD BEEN DRINKING: NEVER
HOW MANY STANDARD DRINKS CONTAINING ALCOHOL DO YOU HAVE ON A TYPICAL DAY: PATIENT DOES NOT DRINK
AUDIT TOTAL SCORE: 0
HOW OFTEN DURING THE LAST YEAR HAVE YOU NEEDED AN ALCOHOLIC DRINK FIRST THING IN THE MORNING TO GET YOURSELF GOING AFTER A NIGHT OF HEAVY DRINKING: NEVER
HOW OFTEN DURING THE LAST YEAR HAVE YOU FOUND THAT YOU WERE NOT ABLE TO STOP DRINKING ONCE YOU HAD STARTED: NEVER

## 2024-07-25 ASSESSMENT — PAIN SCALES - GENERAL: PAINLEVEL: 0-NO PAIN

## 2024-07-25 ASSESSMENT — ENCOUNTER SYMPTOMS
DEPRESSION: 0
OCCASIONAL FEELINGS OF UNSTEADINESS: 0
LOSS OF SENSATION IN FEET: 0

## 2024-07-25 ASSESSMENT — PATIENT HEALTH QUESTIONNAIRE - PHQ9
1. LITTLE INTEREST OR PLEASURE IN DOING THINGS: NOT AT ALL
SUM OF ALL RESPONSES TO PHQ9 QUESTIONS 1 AND 2: 0
2. FEELING DOWN, DEPRESSED OR HOPELESS: NOT AT ALL

## 2024-07-27 ENCOUNTER — PATIENT MESSAGE (OUTPATIENT)
Dept: PRIMARY CARE | Facility: CLINIC | Age: 84
End: 2024-07-27
Payer: MEDICARE

## 2024-08-19 DIAGNOSIS — E03.9 HYPOTHYROIDISM, UNSPECIFIED TYPE: ICD-10-CM

## 2024-08-19 RX ORDER — LEVOTHYROXINE SODIUM 88 UG/1
88 TABLET ORAL DAILY
Qty: 90 TABLET | Refills: 3 | Status: SHIPPED | OUTPATIENT
Start: 2024-08-19 | End: 2025-08-19

## 2024-09-17 DIAGNOSIS — E78.5 HYPERLIPIDEMIA, UNSPECIFIED HYPERLIPIDEMIA TYPE: ICD-10-CM

## 2024-09-18 RX ORDER — PRAVASTATIN SODIUM 20 MG/1
20 TABLET ORAL DAILY
Qty: 90 TABLET | Refills: 3 | Status: SHIPPED | OUTPATIENT
Start: 2024-09-18

## 2024-09-19 ENCOUNTER — PATIENT MESSAGE (OUTPATIENT)
Dept: PRIMARY CARE | Facility: CLINIC | Age: 84
End: 2024-09-19
Payer: MEDICARE

## 2024-09-19 DIAGNOSIS — U07.1 COVID: Primary | ICD-10-CM

## 2024-09-23 ENCOUNTER — TELEPHONE (OUTPATIENT)
Dept: PRIMARY CARE | Facility: CLINIC | Age: 84
End: 2024-09-23
Payer: MEDICARE

## 2024-09-23 RX ORDER — NIRMATRELVIR AND RITONAVIR 150-100 MG
2 KIT ORAL 2 TIMES DAILY
Qty: 20 TABLET | Refills: 0 | Status: SHIPPED | OUTPATIENT
Start: 2024-09-23 | End: 2024-10-03

## 2024-09-29 ENCOUNTER — APPOINTMENT (OUTPATIENT)
Dept: CARDIOLOGY | Facility: HOSPITAL | Age: 84
End: 2024-09-29
Payer: MEDICARE

## 2024-09-29 ENCOUNTER — APPOINTMENT (OUTPATIENT)
Dept: RADIOLOGY | Facility: HOSPITAL | Age: 84
End: 2024-09-29
Payer: MEDICARE

## 2024-09-29 ENCOUNTER — HOSPITAL ENCOUNTER (EMERGENCY)
Facility: HOSPITAL | Age: 84
Discharge: HOME | End: 2024-09-29
Attending: STUDENT IN AN ORGANIZED HEALTH CARE EDUCATION/TRAINING PROGRAM
Payer: MEDICARE

## 2024-09-29 VITALS
HEART RATE: 95 BPM | OXYGEN SATURATION: 97 % | WEIGHT: 155 LBS | TEMPERATURE: 97.5 F | SYSTOLIC BLOOD PRESSURE: 100 MMHG | HEIGHT: 64 IN | BODY MASS INDEX: 26.46 KG/M2 | RESPIRATION RATE: 16 BRPM | DIASTOLIC BLOOD PRESSURE: 50 MMHG

## 2024-09-29 DIAGNOSIS — N39.0 ACUTE URINARY TRACT INFECTION: ICD-10-CM

## 2024-09-29 DIAGNOSIS — R19.7 DIARRHEA, UNSPECIFIED TYPE: Primary | ICD-10-CM

## 2024-09-29 LAB
ALBUMIN SERPL BCP-MCNC: 3.1 G/DL (ref 3.4–5)
ALP SERPL-CCNC: 49 U/L (ref 33–136)
ALT SERPL W P-5'-P-CCNC: 7 U/L (ref 7–45)
ANION GAP SERPL CALCULATED.3IONS-SCNC: 12 MMOL/L (ref 10–20)
APPEARANCE UR: CLEAR
AST SERPL W P-5'-P-CCNC: 15 U/L (ref 9–39)
BACTERIA #/AREA URNS AUTO: ABNORMAL /HPF
BASOPHILS # BLD AUTO: 0.06 X10*3/UL (ref 0–0.1)
BASOPHILS NFR BLD AUTO: 0.3 %
BILIRUB SERPL-MCNC: 0.4 MG/DL (ref 0–1.2)
BILIRUB UR STRIP.AUTO-MCNC: NEGATIVE MG/DL
BUN SERPL-MCNC: 18 MG/DL (ref 6–23)
CALCIUM SERPL-MCNC: 7.1 MG/DL (ref 8.6–10.3)
CHLORIDE SERPL-SCNC: 114 MMOL/L (ref 98–107)
CO2 SERPL-SCNC: 16 MMOL/L (ref 21–32)
COLOR UR: ABNORMAL
CREAT SERPL-MCNC: 0.91 MG/DL (ref 0.5–1.05)
EGFRCR SERPLBLD CKD-EPI 2021: 62 ML/MIN/1.73M*2
EOSINOPHIL # BLD AUTO: 0.06 X10*3/UL (ref 0–0.4)
EOSINOPHIL NFR BLD AUTO: 0.3 %
ERYTHROCYTE [DISTWIDTH] IN BLOOD BY AUTOMATED COUNT: 13.3 % (ref 11.5–14.5)
GLUCOSE SERPL-MCNC: 115 MG/DL (ref 74–99)
GLUCOSE UR STRIP.AUTO-MCNC: NORMAL MG/DL
HCT VFR BLD AUTO: 49 % (ref 36–46)
HGB BLD-MCNC: 15.7 G/DL (ref 12–16)
HYALINE CASTS #/AREA URNS AUTO: ABNORMAL /LPF
IMM GRANULOCYTES # BLD AUTO: 0.09 X10*3/UL (ref 0–0.5)
IMM GRANULOCYTES NFR BLD AUTO: 0.5 % (ref 0–0.9)
KETONES UR STRIP.AUTO-MCNC: NEGATIVE MG/DL
LEUKOCYTE ESTERASE UR QL STRIP.AUTO: ABNORMAL
LIPASE SERPL-CCNC: 21 U/L (ref 9–82)
LYMPHOCYTES # BLD AUTO: 1.15 X10*3/UL (ref 0.8–3)
LYMPHOCYTES NFR BLD AUTO: 6.3 %
MCH RBC QN AUTO: 28.5 PG (ref 26–34)
MCHC RBC AUTO-ENTMCNC: 32 G/DL (ref 32–36)
MCV RBC AUTO: 89 FL (ref 80–100)
MONOCYTES # BLD AUTO: 0.58 X10*3/UL (ref 0.05–0.8)
MONOCYTES NFR BLD AUTO: 3.2 %
MUCOUS THREADS #/AREA URNS AUTO: ABNORMAL /LPF
NEUTROPHILS # BLD AUTO: 16.31 X10*3/UL (ref 1.6–5.5)
NEUTROPHILS NFR BLD AUTO: 89.4 %
NITRITE UR QL STRIP.AUTO: NEGATIVE
NRBC BLD-RTO: 0 /100 WBCS (ref 0–0)
PH UR STRIP.AUTO: 5.5 [PH]
PLATELET # BLD AUTO: 367 X10*3/UL (ref 150–450)
POTASSIUM SERPL-SCNC: 3.4 MMOL/L (ref 3.5–5.3)
PROT SERPL-MCNC: 5.2 G/DL (ref 6.4–8.2)
PROT UR STRIP.AUTO-MCNC: ABNORMAL MG/DL
RBC # BLD AUTO: 5.5 X10*6/UL (ref 4–5.2)
RBC # UR STRIP.AUTO: NEGATIVE /UL
RBC #/AREA URNS AUTO: ABNORMAL /HPF
SODIUM SERPL-SCNC: 139 MMOL/L (ref 136–145)
SP GR UR STRIP.AUTO: >1.05
SQUAMOUS #/AREA URNS AUTO: ABNORMAL /HPF
UROBILINOGEN UR STRIP.AUTO-MCNC: NORMAL MG/DL
WBC # BLD AUTO: 18.3 X10*3/UL (ref 4.4–11.3)
WBC #/AREA URNS AUTO: ABNORMAL /HPF

## 2024-09-29 PROCEDURE — 99285 EMERGENCY DEPT VISIT HI MDM: CPT | Mod: 25

## 2024-09-29 PROCEDURE — 93005 ELECTROCARDIOGRAM TRACING: CPT

## 2024-09-29 PROCEDURE — 2500000004 HC RX 250 GENERAL PHARMACY W/ HCPCS (ALT 636 FOR OP/ED): Performed by: PHYSICIAN ASSISTANT

## 2024-09-29 PROCEDURE — 36415 COLL VENOUS BLD VENIPUNCTURE: CPT | Performed by: STUDENT IN AN ORGANIZED HEALTH CARE EDUCATION/TRAINING PROGRAM

## 2024-09-29 PROCEDURE — 83690 ASSAY OF LIPASE: CPT | Performed by: PHYSICIAN ASSISTANT

## 2024-09-29 PROCEDURE — 74177 CT ABD & PELVIS W/CONTRAST: CPT

## 2024-09-29 PROCEDURE — 74177 CT ABD & PELVIS W/CONTRAST: CPT | Performed by: RADIOLOGY

## 2024-09-29 PROCEDURE — 36415 COLL VENOUS BLD VENIPUNCTURE: CPT | Performed by: PHYSICIAN ASSISTANT

## 2024-09-29 PROCEDURE — 2550000001 HC RX 255 CONTRASTS: Performed by: STUDENT IN AN ORGANIZED HEALTH CARE EDUCATION/TRAINING PROGRAM

## 2024-09-29 PROCEDURE — 96361 HYDRATE IV INFUSION ADD-ON: CPT

## 2024-09-29 PROCEDURE — 81001 URINALYSIS AUTO W/SCOPE: CPT | Performed by: STUDENT IN AN ORGANIZED HEALTH CARE EDUCATION/TRAINING PROGRAM

## 2024-09-29 PROCEDURE — 84075 ASSAY ALKALINE PHOSPHATASE: CPT | Performed by: PHYSICIAN ASSISTANT

## 2024-09-29 PROCEDURE — 87086 URINE CULTURE/COLONY COUNT: CPT | Mod: WESLAB | Performed by: STUDENT IN AN ORGANIZED HEALTH CARE EDUCATION/TRAINING PROGRAM

## 2024-09-29 PROCEDURE — 2500000004 HC RX 250 GENERAL PHARMACY W/ HCPCS (ALT 636 FOR OP/ED): Performed by: STUDENT IN AN ORGANIZED HEALTH CARE EDUCATION/TRAINING PROGRAM

## 2024-09-29 PROCEDURE — 85025 COMPLETE CBC W/AUTO DIFF WBC: CPT | Performed by: STUDENT IN AN ORGANIZED HEALTH CARE EDUCATION/TRAINING PROGRAM

## 2024-09-29 PROCEDURE — 96360 HYDRATION IV INFUSION INIT: CPT | Mod: 59

## 2024-09-29 RX ORDER — CEPHALEXIN 500 MG/1
500 CAPSULE ORAL 2 TIMES DAILY
Qty: 14 CAPSULE | Refills: 0 | Status: SHIPPED | OUTPATIENT
Start: 2024-09-29 | End: 2024-10-06

## 2024-09-29 ASSESSMENT — COLUMBIA-SUICIDE SEVERITY RATING SCALE - C-SSRS
6. HAVE YOU EVER DONE ANYTHING, STARTED TO DO ANYTHING, OR PREPARED TO DO ANYTHING TO END YOUR LIFE?: NO
2. HAVE YOU ACTUALLY HAD ANY THOUGHTS OF KILLING YOURSELF?: NO
1. IN THE PAST MONTH, HAVE YOU WISHED YOU WERE DEAD OR WISHED YOU COULD GO TO SLEEP AND NOT WAKE UP?: NO

## 2024-09-29 ASSESSMENT — PAIN SCALES - GENERAL: PAINLEVEL_OUTOF10: 1

## 2024-09-29 ASSESSMENT — PAIN - FUNCTIONAL ASSESSMENT: PAIN_FUNCTIONAL_ASSESSMENT: 0-10

## 2024-09-29 ASSESSMENT — PAIN DESCRIPTION - LOCATION: LOCATION: ABDOMEN

## 2024-09-29 NOTE — ED TRIAGE NOTES
Pt BIBA from home with complaints of abdominal pain and diarrhea since 4 am. Pt states she felt very weak and thought she would pass out so she lowered herself to ground and was incontinent of still on herself. Pt states abdominal pain has improved since arrival to ER.

## 2024-09-29 NOTE — DISCHARGE INSTRUCTIONS
Thank for choosing [facility] for your healthcare needs today!  You have been diagnosed with enteritis and a urinary tract infection.  Medication has been sent to your retail pharmacy.  Continue to increase your fluids and rest.  Return to the emergency department should symptoms worsen or new symptoms develop.  Follow-up with your primary care provider as recommended after today's visit.  Please call and schedule an appointment with them as soon as you are able to.

## 2024-09-29 NOTE — ED PROVIDER NOTES
HPI   Chief Complaint   Patient presents with    Abdominal Pain       HPI    Patient is an 84-year-old female with a history of diverticulitis presenting for evaluation of diarrhea and abdominal pain.  Patient states that she recently just finished a course of Paxil bid for treatment of COVID-19 infection.  She states that she woke up at 4 AM this morning to use the bathroom.  When patient went to use the bathroom, she felt lightheaded as if she was going to pass out and lowered herself to the floor.  Patient states that she then had large amounts of diarrhea at that time.  Patient was unable to lift herself up off of the floor as she felt like she was going to pass out.  She admits to some mild lower abdominal pain in the presence of a hernia.  She denies nausea or vomiting.  She denies current lightheadedness or dizziness.  No chest pain or shortness of breath.  No flank pain.  No dysuria or hematuria.    Patient History   Past Medical History:   Diagnosis Date    Disease of thyroid gland     Diverticulitis 10/09/2023    HL (hearing loss)     Hypertension     Osteoarthritis 09/06/2023    Stage 3 chronic kidney disease (Multi) 10/18/2023     Past Surgical History:   Procedure Laterality Date    FRACTURE SURGERY      TONSILLECTOMY      WISDOM TOOTH EXTRACTION       Family History   Problem Relation Name Age of Onset    No Known Problems Mother      Cancer Father Pastor      Social History     Tobacco Use    Smoking status: Never    Smokeless tobacco: Never   Vaping Use    Vaping status: Never Used   Substance Use Topics    Alcohol use: Yes     Comment: Social use    Drug use: Never       Physical Exam   ED Triage Vitals   Temp Pulse Resp BP   -- -- -- --      SpO2 Temp src Heart Rate Source Patient Position   -- -- -- --      BP Location FiO2 (%)     -- --       Physical Exam  Vitals and nursing note reviewed.   Constitutional:       Appearance: Normal appearance.      Comments: Covered in stool.   HENT:      Head:  Normocephalic and atraumatic.   Eyes:      Extraocular Movements: Extraocular movements intact.      Conjunctiva/sclera: Conjunctivae normal.      Pupils: Pupils are equal, round, and reactive to light.   Cardiovascular:      Rate and Rhythm: Normal rate and regular rhythm.   Pulmonary:      Effort: Pulmonary effort is normal.      Breath sounds: Normal breath sounds.   Abdominal:      General: Abdomen is flat. Bowel sounds are normal.      Palpations: Abdomen is soft.      Comments: Diffuse tenderness to palpation of the abdomen.   Skin:     General: Skin is warm and dry.   Neurological:      Mental Status: She is alert.           ED Course & MDM   ED Course as of 09/29/24 1606   Sun Sep 29, 2024   1243 EKG on my interpretation shows normal sinus rhythm with rate of 92 beats minute.  Normal axis peer QTc is 450 ms, MA interval 160.  There is no ST elevation or depression, no acute ischemic pattern.  No STEMI.  Normal EKG. [NT]      ED Course User Index  [NT] Kirk Elizondo DO         Diagnoses as of 09/29/24 1606   Diarrhea, unspecified type   Acute urinary tract infection                 No data recorded     [place] Coma Scale Score: 15 (09/29/24 1227 : Yazmin Ochoa LPN)                           Medical Decision Making  Parts of this chart have been completed using voice recognition software. Please excuse any errors of transcription. Despite the medical decision making time stamp above-my medical decision making has taken place during the patient's entire visit. My thought process and reason for plan has been formulated from the time that I saw the patient until the time of disposition and is not specific to one specific moment during their visit and furthermore my MDM encompasses this entire chart and not only this text box.      HPI: Detailed above.    Exam: A medically appropriate exam performed, outlined above, given the known history and presentation.    History obtained from: Patient    Social  Determinants of Health considered during this visit: Coming from home    EKG interpreted by my attending physician, reviewed by myself.    Labs Reviewed   CBC WITH AUTO DIFFERENTIAL - Abnormal       Result Value    WBC 18.3 (*)     nRBC 0.0      RBC 5.50 (*)     Hemoglobin 15.7      Hematocrit 49.0 (*)     MCV 89      MCH 28.5      MCHC 32.0      RDW 13.3      Platelets 367      Neutrophils % 89.4      Immature Granulocytes %, Automated 0.5      Lymphocytes % 6.3      Monocytes % 3.2      Eosinophils % 0.3      Basophils % 0.3      Neutrophils Absolute 16.31 (*)     Immature Granulocytes Absolute, Automated 0.09      Lymphocytes Absolute 1.15      Monocytes Absolute 0.58      Eosinophils Absolute 0.06      Basophils Absolute 0.06     URINALYSIS WITH REFLEX CULTURE AND MICROSCOPIC - Abnormal    Color, Urine Light-Yellow      Appearance, Urine Clear      Specific Gravity, Urine >1.050 (*)     pH, Urine 5.5      Protein, Urine 30 (1+) (*)     Glucose, Urine Normal      Blood, Urine NEGATIVE      Ketones, Urine NEGATIVE      Bilirubin, Urine NEGATIVE      Urobilinogen, Urine Normal      Nitrite, Urine NEGATIVE      Leukocyte Esterase, Urine 250 Faviola/µL (*)    COMPREHENSIVE METABOLIC PANEL - Abnormal    Glucose 115 (*)     Sodium 139      Potassium 3.4 (*)     Chloride 114 (*)     Bicarbonate 16 (*)     Anion Gap 12      Urea Nitrogen 18      Creatinine 0.91      eGFR 62      Calcium 7.1 (*)     Albumin 3.1 (*)     Alkaline Phosphatase 49      Total Protein 5.2 (*)     AST 15      Bilirubin, Total 0.4      ALT 7     MICROSCOPIC ONLY, URINE - Abnormal    WBC, Urine 1-5      RBC, Urine 6-10 (*)     Squamous Epithelial Cells, Urine 1-9 (SPARSE)      Bacteria, Urine 1+ (*)     Mucus, Urine FEW      Hyaline Casts, Urine 1+ (*)    LIPASE - Normal    Lipase 21      Narrative:     Venipuncture immediately after or during the administration of Metamizole may lead to falsely low results. Testing should be performed immediately  prior to Metamizole dosing.   URINE CULTURE   URINALYSIS WITH REFLEX CULTURE AND MICROSCOPIC    Narrative:     The following orders were created for panel order Urinalysis with Reflex Culture and Microscopic.  Procedure                               Abnormality         Status                     ---------                               -----------         ------                     Urinalysis with Reflex C...[[id number]]  Abnormal            Final result               Extra Urine Gray Tube[[id number]]                            In process                   Please view results for these tests on the individual orders.   EXTRA URINE GRAY TUBE     CT abdomen pelvis w IV contrast   Final Result   1.  Active enteritis involving a long segment of the mid to distal   ileum without associated pneumatosis, free air, or fluid collection.   This could be infectious, inflammatory, or ischemic in etiology. The   affected segment of bowel appears similar to that demonstrating   inflammatory changes on prior CT nearly 1 year ago.   2. Scattered small volume free fluid.        MACRO:   None.        Signed by: Gopi Lugo 9/29/2024 2:05 PM   Dictation workstation:   [id number]        Medications   sodium chloride 0.9 % bolus 1,000 mL (0 mL intravenous Stopped 9/29/24 1436)   iohexol (OMNIPaque) 350 mg iodine/mL solution 75 mL (75 mL intravenous Given 9/29/24 1347)   sodium chloride 0.9 % bolus 1,000 mL (0 mL intravenous Stopped 9/29/24 1509)         Differential diagnoses considered for this visit include: Infectious diarrhea versus medication side effect versus electrolyte imbalance versus metabolic disturbance versus urinary tract infection versus diverticulitis    Considerations/further MDM:    Patient is an 84-year-old female senting for evaluation of diarrhea and abdominal pain.  Vital signs are hemodynamically stable within normal limits.  Physical examination demonstrated a stool covered obese female in no acute distress.   There was mild abdominal tenderness on examination.  Labs were obtained with CT abdomen pelvis with IV contrast.  CBC demonstrated mild white blood cell count of 18.3.  CMP was within normal limits.  Lipase was unremarkable. CT abdomen pelvis identified active enteritis that appears similar to demonstrating inflammatory changes of prior CT a year ago.  Patient was witnessed by myself and nursing staff ambulating to the bathroom without difficulty.  Urinalysis was borderline for possible infection.  Patient will be sent an antibiotic for treatment.  Results were discussed with the patient at the bedside, and she did feel comfortable to return home at this time.  Return precautions were discussed, and the patient was released to home in good condition.    All of the patient's questions were answered to the best of my ability. Patient states understanding that they have been screened for an emergency today, and we have not found any etiology of symptoms that requires emergent treatment or admission to the hospital at this point. They understand that they have not had definitive care day and require follow-up for treatment of their condition. They also state understanding that they may have an emergent condition that may potentially have not of detected at this visit and they must return to the emergency department if they develop any worsening of symptoms or new complaints.    Patient was seen in conjunction with attending physician Dr. Kirk Elizondo.   Patient's history, physical exam, diagnostic studies, and treatment plan were discussed thoroughly.    Procedure  Procedures     Leticia Perkins PA-C  09/29/24 7220

## 2024-09-30 LAB — HOLD SPECIMEN: NORMAL

## 2024-10-02 LAB
ATRIAL RATE: 92 BPM
P AXIS: 55 DEGREES
P OFFSET: 190 MS
P ONSET: 136 MS
PR INTERVAL: 160 MS
Q ONSET: 216 MS
QRS COUNT: 15 BEATS
QRS DURATION: 70 MS
QT INTERVAL: 364 MS
QTC CALCULATION(BAZETT): 450 MS
QTC FREDERICIA: 419 MS
R AXIS: -20 DEGREES
T AXIS: 71 DEGREES
T OFFSET: 398 MS
VENTRICULAR RATE: 92 BPM

## 2024-10-04 LAB — BACTERIA UR CULT: ABNORMAL

## 2025-01-28 ASSESSMENT — ENCOUNTER SYMPTOMS
ABDOMINAL PAIN: 0
SHORTNESS OF BREATH: 0
FEVER: 0

## 2025-01-28 NOTE — PROGRESS NOTES
[facility]: [facility]  PROGRESS NOTE      Marzena Robb is a 85 y.o. female that is presenting today for No chief complaint on file..    Assessment/Plan   Diagnoses and all orders for this visit:  Hypertension, unspecified type  Hyperlipidemia, unspecified hyperlipidemia type  Stage 3 chronic kidney disease, unspecified whether stage 3a or 3b CKD (Multi)  Hypothyroidism, unspecified type  Vitamin D deficiency  Gout, unspecified cause, unspecified chronicity, unspecified site    Will xray the left shoulder and right hip.    Trial of medrol dose pack for the shoulder- if not better, to see ortho.    HTN stabel on ACEi.    Lipids controlled on statin.    Will try trazodone for sleep.    Subjective   HPI  85 y.o. female here for follow up.  She feels tired, does not sleep much - 5 hrs/ night - has a hard time falling asleep.  Left shoulder pain - no injury - feels stiff.  Right hip/femur area pain - had a fracture there and ORIF.    Review of Systems   Constitutional:  Negative for fever.   Respiratory:  Negative for shortness of breath.    Cardiovascular:  Negative for chest pain.   Gastrointestinal:  Negative for abdominal pain.   All other systems reviewed and are negative.     Objective   There were no vitals filed for this visit.   There is no height or weight on file to calculate BMI.  Physical Exam  Vitals reviewed.   Constitutional:       Appearance: Normal appearance.   Cardiovascular:      Rate and Rhythm: Normal rate and regular rhythm.      Heart sounds: No murmur heard.  Pulmonary:      Breath sounds: Normal breath sounds. No wheezing, rhonchi or rales.   Musculoskeletal:      Right lower leg: No edema.      Left lower leg: No edema.       Diagnostic Results   Lab Results   Component Value Date    GLUCOSE 115 (H) 09/29/2024    CALCIUM 7.1 (L) 09/29/2024     09/29/2024    K 3.4 (L) 09/29/2024    CO2 16 (L) 09/29/2024     (H) 09/29/2024    BUN 18 09/29/2024    CREATININE  "0.91 09/29/2024     Lab Results   Component Value Date    ALT 7 09/29/2024    AST 15 09/29/2024    ALKPHOS 49 09/29/2024    BILITOT 0.4 09/29/2024     Lab Results   Component Value Date    WBC 18.3 (H) 09/29/2024    HGB 15.7 09/29/2024    HCT 49.0 (H) 09/29/2024    MCV 89 09/29/2024     09/29/2024     Lab Results   Component Value Date    CHOL 204 (H) 07/23/2024    CHOL 213 (H) 07/25/2023    CHOL 206 (H) 07/22/2022     Lab Results   Component Value Date    HDL 62.0 07/23/2024    HDL 62 07/25/2023    HDL 56 07/22/2022     Lab Results   Component Value Date    LDLCALC 124 07/23/2024    LDLCALC 129 07/25/2023    LDLCALC 127 07/22/2022     Lab Results   Component Value Date    TRIG 91 07/23/2024    TRIG 112 07/25/2023    TRIG 117 07/22/2022     No components found for: \"CHOLHDL\"  Lab Results   Component Value Date    HGBA1C 5.6 07/23/2024     Other labs not included in the list above were reviewed either before or during this encounter.    History    Past Medical History:   Diagnosis Date    Disease of thyroid gland     Diverticulitis 10/09/2023    HL (hearing loss)     Hypertension     Osteoarthritis 09/06/2023    Stage 3 chronic kidney disease (Multi) 10/18/2023     Past Surgical History:   Procedure Laterality Date    FRACTURE SURGERY      TONSILLECTOMY      WISDOM TOOTH EXTRACTION       Family History   Problem Relation Name Age of Onset    No Known Problems Mother      Cancer Father Pastor      Social History     Socioeconomic History    Marital status:      Spouse name: Not on file    Number of children: Not on file    Years of education: Not on file    Highest education level: Not on file   Occupational History    Not on file   Tobacco Use    Smoking status: Never    Smokeless tobacco: Never   Vaping Use    Vaping status: Never Used   Substance and Sexual Activity    Alcohol use: Yes     Comment: Social use    Drug use: Never    Sexual activity: Not Currently   Other Topics Concern    Not on file "   Social History Narrative    Not on file     Social Drivers of Health     Financial Resource Strain: Low Risk  (10/9/2023)    Overall Financial Resource Strain (CARDIA)     Difficulty of Paying Living Expenses: Not hard at all   Food Insecurity: No Food Insecurity (10/9/2023)    Hunger Vital Sign     Worried About Running Out of Food in the Last Year: Never true     Ran Out of Food in the Last Year: Never true   Transportation Needs: No Transportation Needs (10/9/2023)    PRAPARE - Transportation     Lack of Transportation (Medical): No     Lack of Transportation (Non-Medical): No   Physical Activity: Inactive (10/9/2023)    Exercise Vital Sign     Days of Exercise per Week: 0 days     Minutes of Exercise per Session: 0 min   Stress: No Stress Concern Present (10/9/2023)    [employer] [place] [employer] - Occupational Stress Questionnaire     Feeling of Stress : Not at all   Social Connections: Unknown (10/9/2023)    Social Connection and Isolation Panel [NHANES]     Frequency of Communication with Friends and Family: More than three times a week     Frequency of Social Gatherings with Friends and Family: Three times a week     Attends [employer] Services: Never     Active Member of Clubs or Organizations: No     Attends Club or Organization Meetings: Never     Marital Status: Not on file   Intimate Partner Violence: Not At Risk (10/9/2023)    Humiliation, Afraid, Rape, and Kick questionnaire     Fear of Current or Ex-Partner: No     Emotionally Abused: No     Physically Abused: No     Sexually Abused: No   Housing Stability: Low Risk  (10/9/2023)    Housing Stability Vital Sign     Unable to Pay for Housing in the Last Year: No     Number of Places Lived in the Last Year: 1     Unstable Housing in the Last Year: No     Allergies   Allergen Reactions    Codeine Other     Vasovagal response    Hydromorphone Other     Vasovagals response    Ciprofloxacin Other     tendonitis     Current Outpatient  Medications on File Prior to Visit   Medication Sig Dispense Refill    allopurinol (Zyloprim) 100 mg tablet TAKE 1 TABLET BY MOUTH ONCE A DAY 90 tablet 3    ipratropium (Atrovent) 42 mcg (0.06 %) nasal spray Administer 2 sprays into each nostril 4 times a day. 15 mL 0    levothyroxine (Synthroid, Levoxyl) 88 mcg tablet Take 1 tablet (88 mcg) by mouth early in the morning.. Take on an empty stomach at the same time each day, either 30 to 60 minutes prior to breakfast 90 tablet 3    lisinopril 10 mg tablet Take 1 tablet (10 mg) by mouth once daily. 90 tablet 3    pravastatin (Pravachol) 20 mg tablet take one tablet by mouth once a day 90 tablet 3     No current facility-administered medications on file prior to visit.     Immunization History   Administered Date(s) Administered    Flu vaccine, quadrivalent, high-dose, preservative free, age 65y+ (FLUZONE) 01/15/2021, 01/26/2023    Flu vaccine, trivalent, preservative free, HIGH-DOSE, age 65y+ (Fluzone) 12/27/2019    Pfizer COVID-19 vaccine, 12 years and older, (30mcg/0.3mL) (Comirnaty) 12/08/2023    Pfizer COVID-19 vaccine, bivalent, age 12 years and older (30 mcg/0.3 mL) 11/10/2022    Pfizer Gray Cap SARS-CoV-2 04/22/2022    Pfizer Purple Cap SARS-CoV-2 02/07/2021, 02/28/2021, 09/29/2021    Pneumococcal conjugate vaccine, 20-valent (PREVNAR 20) 01/18/2024    Tdap vaccine, age 7 year and older (BOOSTRIX, ADACEL) 11/16/2016     Patient's medical history was reviewed and updated either before or during this encounter.       Kirk Miller MD

## 2025-01-29 LAB
POTASSIUM SERPL-SCNC: 4.6 MMOL/L (ref 3.5–5.3)
T4 FREE SERPL-MCNC: 1.7 NG/DL (ref 0.8–1.8)
TSH SERPL-ACNC: 0.36 MIU/L (ref 0.4–4.5)

## 2025-01-30 ENCOUNTER — HOSPITAL ENCOUNTER (OUTPATIENT)
Dept: RADIOLOGY | Facility: CLINIC | Age: 85
Discharge: HOME | End: 2025-01-30
Payer: MEDICARE

## 2025-01-30 ENCOUNTER — OFFICE VISIT (OUTPATIENT)
Dept: PRIMARY CARE | Facility: CLINIC | Age: 85
End: 2025-01-30
Payer: MEDICARE

## 2025-01-30 VITALS
WEIGHT: 165 LBS | DIASTOLIC BLOOD PRESSURE: 80 MMHG | TEMPERATURE: 97.2 F | BODY MASS INDEX: 28.32 KG/M2 | OXYGEN SATURATION: 99 % | SYSTOLIC BLOOD PRESSURE: 138 MMHG | HEART RATE: 75 BPM

## 2025-01-30 DIAGNOSIS — E03.9 HYPOTHYROIDISM, UNSPECIFIED TYPE: ICD-10-CM

## 2025-01-30 DIAGNOSIS — G47.00 INSOMNIA, UNSPECIFIED TYPE: ICD-10-CM

## 2025-01-30 DIAGNOSIS — M25.512 CHRONIC LEFT SHOULDER PAIN: ICD-10-CM

## 2025-01-30 DIAGNOSIS — M25.551 PAIN OF RIGHT HIP: ICD-10-CM

## 2025-01-30 DIAGNOSIS — N18.30 STAGE 3 CHRONIC KIDNEY DISEASE, UNSPECIFIED WHETHER STAGE 3A OR 3B CKD (MULTI): ICD-10-CM

## 2025-01-30 DIAGNOSIS — G89.29 CHRONIC LEFT SHOULDER PAIN: ICD-10-CM

## 2025-01-30 DIAGNOSIS — I10 HYPERTENSION, UNSPECIFIED TYPE: Primary | ICD-10-CM

## 2025-01-30 DIAGNOSIS — R73.9 HYPERGLYCEMIA: ICD-10-CM

## 2025-01-30 DIAGNOSIS — E78.2 MIXED HYPERLIPIDEMIA: ICD-10-CM

## 2025-01-30 DIAGNOSIS — E78.5 HYPERLIPIDEMIA, UNSPECIFIED HYPERLIPIDEMIA TYPE: ICD-10-CM

## 2025-01-30 DIAGNOSIS — M10.9 GOUT, UNSPECIFIED CAUSE, UNSPECIFIED CHRONICITY, UNSPECIFIED SITE: ICD-10-CM

## 2025-01-30 DIAGNOSIS — E55.9 VITAMIN D DEFICIENCY: ICD-10-CM

## 2025-01-30 PROCEDURE — 1159F MED LIST DOCD IN RCRD: CPT | Performed by: INTERNAL MEDICINE

## 2025-01-30 PROCEDURE — 99214 OFFICE O/P EST MOD 30 MIN: CPT | Mod: 25 | Performed by: INTERNAL MEDICINE

## 2025-01-30 PROCEDURE — 3079F DIAST BP 80-89 MM HG: CPT | Performed by: INTERNAL MEDICINE

## 2025-01-30 PROCEDURE — 1125F AMNT PAIN NOTED PAIN PRSNT: CPT | Performed by: INTERNAL MEDICINE

## 2025-01-30 PROCEDURE — 3075F SYST BP GE 130 - 139MM HG: CPT | Performed by: INTERNAL MEDICINE

## 2025-01-30 PROCEDURE — 73030 X-RAY EXAM OF SHOULDER: CPT | Mod: LT

## 2025-01-30 PROCEDURE — 1036F TOBACCO NON-USER: CPT | Performed by: INTERNAL MEDICINE

## 2025-01-30 PROCEDURE — 73502 X-RAY EXAM HIP UNI 2-3 VIEWS: CPT | Mod: RT

## 2025-01-30 PROCEDURE — 99214 OFFICE O/P EST MOD 30 MIN: CPT | Performed by: INTERNAL MEDICINE

## 2025-01-30 PROCEDURE — 90662 IIV NO PRSV INCREASED AG IM: CPT | Performed by: INTERNAL MEDICINE

## 2025-01-30 RX ORDER — TRAZODONE HYDROCHLORIDE 50 MG/1
50 TABLET ORAL NIGHTLY PRN
Qty: 30 TABLET | Refills: 5 | Status: SHIPPED | OUTPATIENT
Start: 2025-01-30 | End: 2026-01-30

## 2025-01-30 RX ORDER — METHYLPREDNISOLONE 4 MG/1
TABLET ORAL
Qty: 21 TABLET | Refills: 0 | Status: SHIPPED | OUTPATIENT
Start: 2025-01-30 | End: 2025-02-05

## 2025-01-30 ASSESSMENT — PAIN SCALES - GENERAL: PAINLEVEL_OUTOF10: 3

## 2025-03-07 DIAGNOSIS — I10 HYPERTENSION, UNSPECIFIED TYPE: ICD-10-CM

## 2025-03-07 RX ORDER — LISINOPRIL 10 MG/1
TABLET ORAL
Qty: 90 TABLET | Refills: 0 | Status: SHIPPED | OUTPATIENT
Start: 2025-03-07

## 2025-03-17 DIAGNOSIS — M10.9 GOUT, UNSPECIFIED CAUSE, UNSPECIFIED CHRONICITY, UNSPECIFIED SITE: ICD-10-CM

## 2025-03-17 RX ORDER — ALLOPURINOL 100 MG/1
100 TABLET ORAL DAILY
Qty: 90 TABLET | Refills: 3 | Status: SHIPPED | OUTPATIENT
Start: 2025-03-17

## 2025-05-02 ENCOUNTER — APPOINTMENT (OUTPATIENT)
Facility: CLINIC | Age: 85
End: 2025-05-02
Payer: MEDICARE

## 2025-06-12 ENCOUNTER — HOSPITAL ENCOUNTER (EMERGENCY)
Facility: HOSPITAL | Age: 85
Discharge: HOME | End: 2025-06-12
Payer: MEDICARE

## 2025-06-12 ENCOUNTER — APPOINTMENT (OUTPATIENT)
Dept: RADIOLOGY | Facility: HOSPITAL | Age: 85
End: 2025-06-12
Payer: MEDICARE

## 2025-06-12 VITALS
BODY MASS INDEX: 27.31 KG/M2 | WEIGHT: 160 LBS | HEIGHT: 64 IN | RESPIRATION RATE: 18 BRPM | SYSTOLIC BLOOD PRESSURE: 186 MMHG | OXYGEN SATURATION: 99 % | TEMPERATURE: 97.9 F | HEART RATE: 73 BPM | DIASTOLIC BLOOD PRESSURE: 72 MMHG

## 2025-06-12 DIAGNOSIS — R10.84 GENERALIZED ABDOMINAL PAIN: ICD-10-CM

## 2025-06-12 DIAGNOSIS — K63.89 EPIPLOIC APPENDAGITIS: Primary | ICD-10-CM

## 2025-06-12 LAB
ALBUMIN SERPL BCP-MCNC: 4.3 G/DL (ref 3.4–5)
ALP SERPL-CCNC: 70 U/L (ref 33–136)
ALT SERPL W P-5'-P-CCNC: 10 U/L (ref 7–45)
ANION GAP SERPL CALCULATED.3IONS-SCNC: 12 MMOL/L (ref 10–20)
APPEARANCE UR: CLEAR
AST SERPL W P-5'-P-CCNC: 17 U/L (ref 9–39)
BASOPHILS # BLD AUTO: 0.05 X10*3/UL (ref 0–0.1)
BASOPHILS NFR BLD AUTO: 0.6 %
BILIRUB SERPL-MCNC: 0.7 MG/DL (ref 0–1.2)
BILIRUB UR STRIP.AUTO-MCNC: NEGATIVE MG/DL
BUN SERPL-MCNC: 22 MG/DL (ref 6–23)
CALCIUM SERPL-MCNC: 10.1 MG/DL (ref 8.6–10.3)
CHLORIDE SERPL-SCNC: 106 MMOL/L (ref 98–107)
CO2 SERPL-SCNC: 25 MMOL/L (ref 21–32)
COLOR UR: COLORLESS
CREAT SERPL-MCNC: 1.02 MG/DL (ref 0.5–1.05)
EGFRCR SERPLBLD CKD-EPI 2021: 54 ML/MIN/1.73M*2
EOSINOPHIL # BLD AUTO: 0.48 X10*3/UL (ref 0–0.4)
EOSINOPHIL NFR BLD AUTO: 6.1 %
ERYTHROCYTE [DISTWIDTH] IN BLOOD BY AUTOMATED COUNT: 12.7 % (ref 11.5–14.5)
GLUCOSE SERPL-MCNC: 101 MG/DL (ref 74–99)
GLUCOSE UR STRIP.AUTO-MCNC: NORMAL MG/DL
HCT VFR BLD AUTO: 42.5 % (ref 36–46)
HGB BLD-MCNC: 13.9 G/DL (ref 12–16)
IMM GRANULOCYTES # BLD AUTO: 0.02 X10*3/UL (ref 0–0.5)
IMM GRANULOCYTES NFR BLD AUTO: 0.3 % (ref 0–0.9)
KETONES UR STRIP.AUTO-MCNC: NEGATIVE MG/DL
LACTATE SERPL-SCNC: 1.2 MMOL/L (ref 0.4–2)
LEUKOCYTE ESTERASE UR QL STRIP.AUTO: ABNORMAL
LIPASE SERPL-CCNC: 27 U/L (ref 9–82)
LYMPHOCYTES # BLD AUTO: 1.44 X10*3/UL (ref 0.8–3)
LYMPHOCYTES NFR BLD AUTO: 18.2 %
MCH RBC QN AUTO: 29 PG (ref 26–34)
MCHC RBC AUTO-ENTMCNC: 32.7 G/DL (ref 32–36)
MCV RBC AUTO: 89 FL (ref 80–100)
MONOCYTES # BLD AUTO: 0.47 X10*3/UL (ref 0.05–0.8)
MONOCYTES NFR BLD AUTO: 5.9 %
MUCOUS THREADS #/AREA URNS AUTO: NORMAL /LPF
NEUTROPHILS # BLD AUTO: 5.44 X10*3/UL (ref 1.6–5.5)
NEUTROPHILS NFR BLD AUTO: 68.9 %
NITRITE UR QL STRIP.AUTO: NEGATIVE
NRBC BLD-RTO: 0 /100 WBCS (ref 0–0)
PH UR STRIP.AUTO: 6.5 [PH]
PLATELET # BLD AUTO: 337 X10*3/UL (ref 150–450)
POTASSIUM SERPL-SCNC: 4.3 MMOL/L (ref 3.5–5.3)
PROT SERPL-MCNC: 6.8 G/DL (ref 6.4–8.2)
PROT UR STRIP.AUTO-MCNC: NEGATIVE MG/DL
RBC # BLD AUTO: 4.79 X10*6/UL (ref 4–5.2)
RBC # UR STRIP.AUTO: NEGATIVE MG/DL
RBC #/AREA URNS AUTO: NORMAL /HPF
SODIUM SERPL-SCNC: 139 MMOL/L (ref 136–145)
SP GR UR STRIP.AUTO: 1.01
UROBILINOGEN UR STRIP.AUTO-MCNC: NORMAL MG/DL
WBC # BLD AUTO: 7.9 X10*3/UL (ref 4.4–11.3)
WBC #/AREA URNS AUTO: NORMAL /HPF

## 2025-06-12 PROCEDURE — 2500000004 HC RX 250 GENERAL PHARMACY W/ HCPCS (ALT 636 FOR OP/ED): Performed by: PHYSICIAN ASSISTANT

## 2025-06-12 PROCEDURE — 85025 COMPLETE CBC W/AUTO DIFF WBC: CPT | Performed by: PHYSICIAN ASSISTANT

## 2025-06-12 PROCEDURE — 83605 ASSAY OF LACTIC ACID: CPT | Performed by: PHYSICIAN ASSISTANT

## 2025-06-12 PROCEDURE — 87086 URINE CULTURE/COLONY COUNT: CPT | Mod: WESLAB | Performed by: PHYSICIAN ASSISTANT

## 2025-06-12 PROCEDURE — 74177 CT ABD & PELVIS W/CONTRAST: CPT

## 2025-06-12 PROCEDURE — 96361 HYDRATE IV INFUSION ADD-ON: CPT

## 2025-06-12 PROCEDURE — 81001 URINALYSIS AUTO W/SCOPE: CPT | Performed by: PHYSICIAN ASSISTANT

## 2025-06-12 PROCEDURE — 99285 EMERGENCY DEPT VISIT HI MDM: CPT | Mod: 25

## 2025-06-12 PROCEDURE — 36415 COLL VENOUS BLD VENIPUNCTURE: CPT | Performed by: PHYSICIAN ASSISTANT

## 2025-06-12 PROCEDURE — 80053 COMPREHEN METABOLIC PANEL: CPT | Performed by: PHYSICIAN ASSISTANT

## 2025-06-12 PROCEDURE — 74177 CT ABD & PELVIS W/CONTRAST: CPT | Performed by: RADIOLOGY

## 2025-06-12 PROCEDURE — 83690 ASSAY OF LIPASE: CPT | Performed by: PHYSICIAN ASSISTANT

## 2025-06-12 PROCEDURE — 2550000001 HC RX 255 CONTRASTS: Performed by: PHYSICIAN ASSISTANT

## 2025-06-12 PROCEDURE — 96374 THER/PROPH/DIAG INJ IV PUSH: CPT | Mod: 59

## 2025-06-12 PROCEDURE — 96375 TX/PRO/DX INJ NEW DRUG ADDON: CPT

## 2025-06-12 RX ORDER — KETOROLAC TROMETHAMINE 15 MG/ML
15 INJECTION, SOLUTION INTRAMUSCULAR; INTRAVENOUS ONCE
Status: COMPLETED | OUTPATIENT
Start: 2025-06-12 | End: 2025-06-12

## 2025-06-12 RX ORDER — ONDANSETRON 4 MG/1
4 TABLET, ORALLY DISINTEGRATING ORAL EVERY 8 HOURS PRN
Qty: 20 TABLET | Refills: 0 | Status: SHIPPED | OUTPATIENT
Start: 2025-06-12 | End: 2025-06-19

## 2025-06-12 RX ORDER — ACETAMINOPHEN 325 MG/1
650 TABLET ORAL EVERY 6 HOURS PRN
Qty: 30 TABLET | Refills: 0 | Status: SHIPPED | OUTPATIENT
Start: 2025-06-12 | End: 2025-06-22

## 2025-06-12 RX ORDER — ONDANSETRON HYDROCHLORIDE 2 MG/ML
4 INJECTION, SOLUTION INTRAVENOUS ONCE
Status: COMPLETED | OUTPATIENT
Start: 2025-06-12 | End: 2025-06-12

## 2025-06-12 RX ORDER — MORPHINE SULFATE 2 MG/ML
2 INJECTION, SOLUTION INTRAMUSCULAR; INTRAVENOUS ONCE
Status: COMPLETED | OUTPATIENT
Start: 2025-06-12 | End: 2025-06-12

## 2025-06-12 RX ADMIN — MORPHINE SULFATE 2 MG: 2 INJECTION, SOLUTION INTRAMUSCULAR; INTRAVENOUS at 17:25

## 2025-06-12 RX ADMIN — SODIUM CHLORIDE 500 ML: 900 INJECTION, SOLUTION INTRAVENOUS at 17:26

## 2025-06-12 RX ADMIN — IOHEXOL 75 ML: 350 INJECTION, SOLUTION INTRAVENOUS at 18:09

## 2025-06-12 RX ADMIN — ONDANSETRON 4 MG: 2 INJECTION, SOLUTION INTRAMUSCULAR; INTRAVENOUS at 17:26

## 2025-06-12 RX ADMIN — KETOROLAC TROMETHAMINE 15 MG: 15 INJECTION, SOLUTION INTRAMUSCULAR; INTRAVENOUS at 17:26

## 2025-06-12 ASSESSMENT — PAIN DESCRIPTION - PAIN TYPE: TYPE: ACUTE PAIN

## 2025-06-12 ASSESSMENT — PAIN DESCRIPTION - PROGRESSION: CLINICAL_PROGRESSION: GRADUALLY IMPROVING

## 2025-06-12 ASSESSMENT — PAIN - FUNCTIONAL ASSESSMENT
PAIN_FUNCTIONAL_ASSESSMENT: 0-10
PAIN_FUNCTIONAL_ASSESSMENT: 0-10

## 2025-06-12 ASSESSMENT — PAIN DESCRIPTION - LOCATION
LOCATION: ABDOMEN
LOCATION: ABDOMEN

## 2025-06-12 ASSESSMENT — PAIN DESCRIPTION - ORIENTATION: ORIENTATION: RIGHT;LOWER

## 2025-06-12 ASSESSMENT — PAIN SCALES - GENERAL
PAINLEVEL_OUTOF10: 2
PAINLEVEL_OUTOF10: 10 - WORST POSSIBLE PAIN

## 2025-06-12 NOTE — ED TRIAGE NOTES
Pt to ED for RLQ pain that started around 1200 today that is sharp, shooting that is intermittent. Pt was recently treated for UTI with one dose left. Pt still with appendix. No nausea, vomiting. No medications taken for pain today.

## 2025-06-12 NOTE — ED PROVIDER NOTES
HPI   Chief Complaint   Patient presents with    Abdominal Pain     Pt to ED for RLQ pain that started around 1200 today that is sharp, shooting that is intermittent. Pt was recently treated for UTI with one dose left. Pt still with appendix. No nausea, vomiting. No medications taken for pain today.        85-year-old female presented emergency department with a chief complaint of abrupt pain in her right lateral portion of her abdomen.  Started around noon today.  Denies nausea or vomiting.  Denies abnormal bowel movement.  Denies history of kidney stone.  Does have diverticulitis history.  Well-appearing nontoxic afebrile, pain is mild to moderate.  No other complaint.              Patient History   Medical History[1]  Surgical History[2]  Family History[3]  Social History[4]    Physical Exam   ED Triage Vitals [06/12/25 1642]   Temperature Heart Rate Respirations BP   36.6 °C (97.9 °F) 97 20 (!) 172/120      Pulse Ox Temp Source Heart Rate Source Patient Position   100 % Oral -- Lying      BP Location FiO2 (%)     Right arm --       Physical Exam  Vitals and nursing note reviewed.   Constitutional:       Appearance: She is well-developed.   HENT:      Head: Normocephalic.      Mouth/Throat:      Mouth: Mucous membranes are moist.   Cardiovascular:      Rate and Rhythm: Normal rate and regular rhythm.   Pulmonary:      Effort: Pulmonary effort is normal.   Abdominal:      Comments: Tenderness to the right lateral abdomen, soft, no rebound or guarding   Skin:     General: Skin is warm.   Neurological:      General: No focal deficit present.      Mental Status: She is alert and oriented to person, place, and time.   Psychiatric:         Mood and Affect: Mood normal.           ED Course & MDM   Diagnoses as of 06/12/25 1943   Epiploic appendagitis   Generalized abdominal pain                 No data recorded     Nicolás Coma Scale Score: 15 (06/12/25 1644 : Dulce Lopez RN)                           Medical  Decision Making  I have seen and evaluated this patient.  Physician available for consultation.  Vital signs have been reviewed.  All laboratory and diagnostic imaging is reviewed by myself and interpreted by myself unless otherwise stated.  Additionally imaging is interpreted by radiologist.    CBC without significant leukocytosis or anemia, metabolic panel without significant renal impairment or electrolyte abnormality.  Lactic acid normal.  Noninfected urinalysis.  CT scan most consistent with epiploic appendagitis.  Did have general surgery reviewed CT.  Patient is tolerating by mouth.  Feels improved.  Released in stable condition with outpatient gastroenterology follow-up for outpatient colonoscopy.          Labs Reviewed   CBC WITH AUTO DIFFERENTIAL - Abnormal       Result Value    WBC 7.9      nRBC 0.0      RBC 4.79      Hemoglobin 13.9      Hematocrit 42.5      MCV 89      MCH 29.0      MCHC 32.7      RDW 12.7      Platelets 337      Neutrophils % 68.9      Immature Granulocytes %, Automated 0.3      Lymphocytes % 18.2      Monocytes % 5.9      Eosinophils % 6.1      Basophils % 0.6      Neutrophils Absolute 5.44      Immature Granulocytes Absolute, Automated 0.02      Lymphocytes Absolute 1.44      Monocytes Absolute 0.47      Eosinophils Absolute 0.48 (*)     Basophils Absolute 0.05     COMPREHENSIVE METABOLIC PANEL - Abnormal    Glucose 101 (*)     Sodium 139      Potassium 4.3      Chloride 106      Bicarbonate 25      Anion Gap 12      Urea Nitrogen 22      Creatinine 1.02      eGFR 54 (*)     Calcium 10.1      Albumin 4.3      Alkaline Phosphatase 70      Total Protein 6.8      AST 17      Bilirubin, Total 0.7      ALT 10     URINALYSIS WITH REFLEX CULTURE AND MICROSCOPIC - Abnormal    Color, Urine Colorless (*)     Appearance, Urine Clear      Specific Gravity, Urine 1.008      pH, Urine 6.5      Protein, Urine NEGATIVE      Glucose, Urine Normal      Blood, Urine NEGATIVE      Ketones, Urine  NEGATIVE      Bilirubin, Urine NEGATIVE      Urobilinogen, Urine Normal      Nitrite, Urine NEGATIVE      Leukocyte Esterase, Urine 25 Faviola/uL (*)    LIPASE - Normal    Lipase 27      Narrative:     Venipuncture immediately after or during the administration of Metamizole may lead to falsely low results. Testing should be performed immediately prior to Metamizole dosing.   LACTATE - Normal    Lactate 1.2      Narrative:     Venipuncture immediately after or during the administration of Metamizole may lead to falsely low results. Testing should be performed immediately prior to Metamizole dosing.   URINE CULTURE   MICROSCOPIC ONLY, URINE    WBC, Urine 1-5      RBC, Urine 1-2      Mucus, Urine FEW     URINALYSIS WITH REFLEX CULTURE AND MICROSCOPIC    Narrative:     The following orders were created for panel order Urinalysis with Reflex Culture and Microscopic.  Procedure                               Abnormality         Status                     ---------                               -----------         ------                     Urinalysis with Reflex C...[[id number]]  Abnormal            Final result               Extra Urine Gray Tube[[id number]]                            In process                   Please view results for these tests on the individual orders.   EXTRA URINE GRAY TUBE     CT abdomen pelvis w IV contrast   Final Result   1.  Subjective wall prominence involving the distal transverse colon   with mild adjacent curvilinear density inferior to the gallbladder.   Recommend correlation with symptomatology at this level. In the   appropriate clinical setting the findings could be due to focal   colitis or omental infarct although nonspecific. Underlying colon   lesion not reliably excluded. Attention to this region recommended on   follow-up assessment.             MACRO:   None        Signed by: Clemente Lewis 6/12/2025 6:32 PM   Dictation workstation:   [id number]        Medications   morphine  injection 2 mg (2 mg intravenous Given 6/12/25 1725)   ondansetron (Zofran) injection 4 mg (4 mg intravenous Given 6/12/25 1726)   ketorolac (Toradol) injection 15 mg (15 mg intravenous Given 6/12/25 1726)   sodium chloride 0.9 % bolus 500 mL (0 mL intravenous Stopped 6/12/25 1833)   iohexol (OMNIPaque) 350 mg iodine/mL solution 75 mL (75 mL intravenous Given 6/12/25 1809)     New Prescriptions    ACETAMINOPHEN (TYLENOL) 325 MG TABLET    Take 2 tablets (650 mg) by mouth every 6 hours if needed for mild pain (1 - 3) for up to 10 days.    ONDANSETRON ODT (ZOFRAN-ODT) 4 MG DISINTEGRATING TABLET    Dissolve 1 tablet (4 mg) in the mouth every 8 hours if needed for nausea or vomiting for up to 7 days.         Procedure  Procedures       [1]   Past Medical History:  Diagnosis Date    Disease of thyroid gland     Diverticulitis 10/09/2023    HL (hearing loss)     Hypertension     Osteoarthritis 09/06/2023    Stage 3 chronic kidney disease (Multi) 10/18/2023   [2]   Past Surgical History:  Procedure Laterality Date    FRACTURE SURGERY      TONSILLECTOMY      WISDOM TOOTH EXTRACTION     [3]   Family History  Problem Relation Name Age of Onset    No Known Problems Mother      Cancer Father Pastor    [4]   Social History  Tobacco Use    Smoking status: Never    Smokeless tobacco: Never   Vaping Use    Vaping status: Never Used   Substance Use Topics    Alcohol use: Yes     Comment: Social use    Drug use: Never        Cuauhtemoc Giron PA-C  06/12/25 1943

## 2025-06-13 LAB — HOLD SPECIMEN: NORMAL

## 2025-06-14 LAB — BACTERIA UR CULT: NO GROWTH

## 2025-06-21 DIAGNOSIS — I10 HYPERTENSION, UNSPECIFIED TYPE: ICD-10-CM

## 2025-06-24 RX ORDER — LISINOPRIL 10 MG/1
10 TABLET ORAL DAILY
Qty: 90 TABLET | Refills: 3 | Status: SHIPPED | OUTPATIENT
Start: 2025-06-24

## 2025-07-27 DIAGNOSIS — E78.2 MIXED HYPERLIPIDEMIA: ICD-10-CM

## 2025-07-27 DIAGNOSIS — E55.9 VITAMIN D DEFICIENCY: ICD-10-CM

## 2025-07-27 DIAGNOSIS — R73.9 HYPERGLYCEMIA: ICD-10-CM

## 2025-07-27 DIAGNOSIS — I10 HYPERTENSION, UNSPECIFIED TYPE: ICD-10-CM

## 2025-07-29 LAB
25(OH)D3+25(OH)D2 SERPL-MCNC: 76 NG/ML (ref 30–100)
ALBUMIN SERPL-MCNC: 4.1 G/DL (ref 3.6–5.1)
ALP SERPL-CCNC: 79 U/L (ref 37–153)
ALT SERPL-CCNC: 10 U/L (ref 6–29)
ANION GAP SERPL CALCULATED.4IONS-SCNC: 11 MMOL/L (CALC) (ref 7–17)
AST SERPL-CCNC: 15 U/L (ref 10–35)
BASOPHILS # BLD AUTO: 58 CELLS/UL (ref 0–200)
BASOPHILS NFR BLD AUTO: 0.8 %
BILIRUB SERPL-MCNC: 0.8 MG/DL (ref 0.2–1.2)
BUN SERPL-MCNC: 30 MG/DL (ref 7–25)
CALCIUM SERPL-MCNC: 9.6 MG/DL (ref 8.6–10.4)
CHLORIDE SERPL-SCNC: 104 MMOL/L (ref 98–110)
CHOLEST SERPL-MCNC: 200 MG/DL
CHOLEST/HDLC SERPL: 3.3 (CALC)
CO2 SERPL-SCNC: 25 MMOL/L (ref 20–32)
CREAT SERPL-MCNC: 1.28 MG/DL (ref 0.6–0.95)
EGFRCR SERPLBLD CKD-EPI 2021: 41 ML/MIN/1.73M2
EOSINOPHIL # BLD AUTO: 577 CELLS/UL (ref 15–500)
EOSINOPHIL NFR BLD AUTO: 7.9 %
ERYTHROCYTE [DISTWIDTH] IN BLOOD BY AUTOMATED COUNT: 13.9 % (ref 11–15)
EST. AVERAGE GLUCOSE BLD GHB EST-MCNC: 117 MG/DL
EST. AVERAGE GLUCOSE BLD GHB EST-SCNC: 6.5 MMOL/L
GLUCOSE SERPL-MCNC: 87 MG/DL (ref 65–99)
HBA1C MFR BLD: 5.7 %
HCT VFR BLD AUTO: 42.5 % (ref 35–45)
HDLC SERPL-MCNC: 60 MG/DL
HGB BLD-MCNC: 13.6 G/DL (ref 11.7–15.5)
LDLC SERPL CALC-MCNC: 115 MG/DL (CALC)
LYMPHOCYTES # BLD AUTO: 1372 CELLS/UL (ref 850–3900)
LYMPHOCYTES NFR BLD AUTO: 18.8 %
MCH RBC QN AUTO: 29.3 PG (ref 27–33)
MCHC RBC AUTO-ENTMCNC: 32 G/DL (ref 32–36)
MCV RBC AUTO: 91.6 FL (ref 80–100)
MONOCYTES # BLD AUTO: 540 CELLS/UL (ref 200–950)
MONOCYTES NFR BLD AUTO: 7.4 %
NEUTROPHILS # BLD AUTO: 4752 CELLS/UL (ref 1500–7800)
NEUTROPHILS NFR BLD AUTO: 65.1 %
NONHDLC SERPL-MCNC: 140 MG/DL (CALC)
PLATELET # BLD AUTO: 293 THOUSAND/UL (ref 140–400)
PMV BLD REES-ECKER: 10.6 FL (ref 7.5–12.5)
POTASSIUM SERPL-SCNC: 4.9 MMOL/L (ref 3.5–5.3)
PROT SERPL-MCNC: 6.5 G/DL (ref 6.1–8.1)
RBC # BLD AUTO: 4.64 MILLION/UL (ref 3.8–5.1)
SODIUM SERPL-SCNC: 140 MMOL/L (ref 135–146)
TRIGL SERPL-MCNC: 131 MG/DL
TSH SERPL-ACNC: 0.6 MIU/L (ref 0.4–4.5)
WBC # BLD AUTO: 7.3 THOUSAND/UL (ref 3.8–10.8)

## 2025-07-31 ASSESSMENT — ENCOUNTER SYMPTOMS
FEVER: 0
DIARRHEA: 0
ABDOMINAL PAIN: 0
COUGH: 0
APPETITE CHANGE: 0
HEADACHES: 0
CHILLS: 0
SHORTNESS OF BREATH: 0
NAUSEA: 0
VOMITING: 0

## 2025-07-31 NOTE — PROGRESS NOTES
[facility]: [facility]  MEDICARE WELLNESS EXAM      Marzena Robb is a 85 y.o. female that is presenting today for No chief complaint on file..    Assessment/Plan    Diagnoses and all orders for this visit:  Annual physical exam  Hypertension, unspecified type  Hyperlipidemia, unspecified hyperlipidemia type  Stage 3 chronic kidney disease, unspecified whether stage 3a or 3b CKD (Multi)  Hypothyroidism, unspecified type  Vitamin D deficiency  Gout, unspecified cause, unspecified chronicity, unspecified site    Gout, continue allopurinol.    Hypothyroidism, clinically and chemically euthyroid, continue levothyroxine.    Hypertension stable on lisinopril.    Hyperlipidemia on pravastatin.    Reviewed screening prevention schedule.    ADVANCED CARE PLANNING  Advanced Care Planning was discussed with patient:  The patient has an active advanced care plan on file. The patient has an active surrogate decision-maker on file.  Encouraged the patient to confirm that Living Will and Healthcare Power of  (HCPoA) are accurate and up to date.  Encouraged the patient to confirm that our office be provided a copy of any documentation in the event that anything changes.    ACTIVITIES OF DAILY LIVING  Basic ADLs:  Bathing: Independent, Dressing: Independent, Toileting: Independent, Transferring: Independent, Continence: Independent, Feeding: Independent.    Instrumental ADLs:  Ability to use phone: Independent, Shopping: Independent, Cooking: Independent, House-keeping: Independent, Laundry: Independent, Transportation: Independent, Medication Management: Independent, Finance Management: Independent.    Subjective   HPI  This patient presents today for annual physical, Medicare wellness exam.  Discussed screening/prevention, healthy lifestyle and code status.   Reviewed the patient's wishes regarding decision making.    Consultant visits and notes reviewed: None    Stable from a functional standpoint  in regard to ADLs and IADLs.  No recent falls are reported.    The patient denies chest pain and shortness of breath.  No exertion-provoked or anginal-type symptoms are reported.    Review of Systems   Constitutional:  Negative for appetite change, chills and fever.   Respiratory:  Negative for cough and shortness of breath.    Cardiovascular:  Negative for chest pain.   Gastrointestinal:  Negative for abdominal pain, diarrhea, nausea and vomiting.   Neurological:  Negative for headaches.   All other systems reviewed and are negative.    Objective   There were no vitals filed for this visit.   There is no height or weight on file to calculate BMI.  Physical Exam  Vitals reviewed.   Constitutional:       General: She is not in acute distress.     Appearance: She is not toxic-appearing.   HENT:      Head: Normocephalic and atraumatic.      Mouth/Throat:      Mouth: Mucous membranes are moist.     Eyes:      Pupils: Pupils are equal, round, and reactive to light.       Cardiovascular:      Rate and Rhythm: Normal rate and regular rhythm.      Heart sounds: No murmur heard.  Pulmonary:      Breath sounds: Normal breath sounds. No wheezing, rhonchi or rales.   Abdominal:      General: Bowel sounds are normal. There is no distension.      Palpations: Abdomen is soft.     Musculoskeletal:      Right lower leg: No edema.      Left lower leg: No edema.     Neurological:      General: No focal deficit present.      Mental Status: She is alert and oriented to person, place, and time.       Diagnostic Results   Lab Results   Component Value Date    GLUCOSE 87 07/28/2025    CALCIUM 9.6 07/28/2025     07/28/2025    K 4.9 07/28/2025    CO2 25 07/28/2025     07/28/2025    BUN 30 (H) 07/28/2025    CREATININE 1.28 (H) 07/28/2025     Lab Results   Component Value Date    ALT 10 07/28/2025    AST 15 07/28/2025    ALKPHOS 79 07/28/2025    BILITOT 0.8 07/28/2025     Lab Results   Component Value Date    WBC 7.3 07/28/2025     "HGB 13.6 07/28/2025    HCT 42.5 07/28/2025    MCV 91.6 07/28/2025     07/28/2025     Lab Results   Component Value Date    CHOL 200 (H) 07/28/2025    CHOL 204 (H) 07/23/2024    CHOL 213 (H) 07/25/2023     Lab Results   Component Value Date    HDL 60 07/28/2025    HDL 62.0 07/23/2024    HDL 62 07/25/2023     Lab Results   Component Value Date    LDLCALC 115 (H) 07/28/2025    LDLCALC 124 07/23/2024    LDLCALC 129 07/25/2023     Lab Results   Component Value Date    TRIG 131 07/28/2025    TRIG 91 07/23/2024    TRIG 112 07/25/2023     No components found for: \"CHOLHDL\"  Lab Results   Component Value Date    HGBA1C 5.7 (H) 07/28/2025     Other labs not included in the list above reviewed either before or during this encounter.    History   Medical History[1]  Surgical History[2]  Family History[3]  Social History     Socioeconomic History    Marital status:      Spouse name: Not on file    Number of children: Not on file    Years of education: Not on file    Highest education level: Not on file   Occupational History    Not on file   Tobacco Use    Smoking status: Never    Smokeless tobacco: Never   Vaping Use    Vaping status: Never Used   Substance and Sexual Activity    Alcohol use: Yes     Alcohol/week: 1.0 standard drink of alcohol     Types: 1 Glasses of wine per week     Comment: Social use    Drug use: Never    Sexual activity: Not Currently   Other Topics Concern    Not on file   Social History Narrative    Not on file     Social Drivers of Health     Financial Resource Strain: Low Risk  (10/9/2023)    Overall Financial Resource Strain (CARDIA)     Difficulty of Paying Living Expenses: Not hard at all   Food Insecurity: No Food Insecurity (10/9/2023)    Hunger Vital Sign     Worried About Running Out of Food in the Last Year: Never true     Ran Out of Food in the Last Year: Never true   Transportation Needs: No Transportation Needs (10/9/2023)    PRAPARE - Transportation     Lack of Transportation " (Medical): No     Lack of Transportation (Non-Medical): No   Physical Activity: Inactive (10/9/2023)    Exercise Vital Sign     Days of Exercise per Week: 0 days     Minutes of Exercise per Session: 0 min   Stress: No Stress Concern Present (10/9/2023)    [employer] - Occupational Stress Questionnaire     Feeling of Stress : Not at all   Social Connections: Unknown (10/9/2023)    Social Connection and Isolation Panel     Frequency of Communication with Friends and Family: More than three times a week     Frequency of Social Gatherings with Friends and Family: Three times a week     Attends [employer] Services: Never     Active Member of Clubs or Organizations: No     Attends Club or Organization Meetings: Never     Marital Status: Not on file   Intimate Partner Violence: Not At Risk (10/9/2023)    Humiliation, Afraid, Rape, and Kick questionnaire     Fear of Current or Ex-Partner: No     Emotionally Abused: No     Physically Abused: No     Sexually Abused: No   Housing Stability: Low Risk  (10/9/2023)    Housing Stability Vital Sign     Unable to Pay for Housing in the Last Year: No     Number of Places Lived in the Last Year: 1     Unstable Housing in the Last Year: No     Allergies[4]  Medications Ordered Prior to Encounter[5]  Immunization History   Administered Date(s) Administered    COVID-19, mRNA, LNP-S, PF, 30 mcg/0.3 mL dose 02/07/2021, 02/28/2021, 09/29/2021    Flu vaccine, quadrivalent, high-dose, preservative free, age 65y+ (FLUZONE) 01/15/2021, 01/26/2023    Flu vaccine, trivalent, preservative free, HIGH-DOSE, age 65y+ (Fluzone) 12/27/2019, 01/30/2025    Pfizer COVID-19 vaccine, 12 years and older, (30mcg/0.3mL) (Comirnaty) 12/08/2023    Pfizer COVID-19 vaccine, bivalent, age 12 years and older (30 mcg/0.3 mL) 11/10/2022    Pfizer Gray Cap SARS-CoV-2 04/22/2022    Pneumococcal conjugate vaccine, 20-valent (PREVNAR 20) 01/18/2024    Tdap vaccine, age 7 year and older  (BOOSTRIX, ADACEL) 11/16/2016     Patient's medical history was reviewed and updated either before or during this encounter.     Kirk Miller MD         [1]   Past Medical History:  Diagnosis Date    Arthritis     Disease of thyroid gland     Diverticulitis 10/09/2023    HL (hearing loss)     Hypertension     Osteoarthritis 09/06/2023    Stage 3 chronic kidney disease (Multi) 10/18/2023   [2]   Past Surgical History:  Procedure Laterality Date    FRACTURE SURGERY      TONSILLECTOMY      WISDOM TOOTH EXTRACTION     [3]   Family History  Problem Relation Name Age of Onset    Hypertension Mother Gabriel     Cancer Father Pastor     Arthritis Mother Gabriel     Hearing loss Mother Gabriel     Heart disease Mother Gabriel     Blood Disorder Mother Gabriel     Colon cancer Father Pastor    [4]   Allergies  Allergen Reactions    Codeine Other     Vasovagal response    Hydromorphone Other     Vasovagals response    Ciprofloxacin Other     tendonitis   [5]   Current Outpatient Medications on File Prior to Visit   Medication Sig Dispense Refill    allopurinol (Zyloprim) 100 mg tablet TAKE ONE TABLET BY MOUTH ONCE A DAY 90 tablet 3    ipratropium (Atrovent) 42 mcg (0.06 %) nasal spray Administer 2 sprays into each nostril 4 times a day. (Patient not taking: Reported on 1/30/2025) 15 mL 0    levothyroxine (Synthroid, Levoxyl) 88 mcg tablet Take 1 tablet (88 mcg) by mouth early in the morning.. Take on an empty stomach at the same time each day, either 30 to 60 minutes prior to breakfast 90 tablet 3    lisinopril 10 mg tablet TAKE ONE TABLET BY MOUTH EVERY DAY 90 tablet 3    pravastatin (Pravachol) 20 mg tablet take one tablet by mouth once a day 90 tablet 3    traZODone (Desyrel) 50 mg tablet Take 1 tablet (50 mg) by mouth as needed at bedtime for sleep. 30 tablet 5     No current facility-administered medications on file prior to visit.

## 2025-08-01 ENCOUNTER — OFFICE VISIT (OUTPATIENT)
Dept: PRIMARY CARE | Facility: CLINIC | Age: 85
End: 2025-08-01
Payer: MEDICARE

## 2025-08-01 VITALS
TEMPERATURE: 96.9 F | HEIGHT: 64 IN | SYSTOLIC BLOOD PRESSURE: 136 MMHG | OXYGEN SATURATION: 96 % | HEART RATE: 79 BPM | DIASTOLIC BLOOD PRESSURE: 86 MMHG | WEIGHT: 163 LBS | BODY MASS INDEX: 27.83 KG/M2

## 2025-08-01 DIAGNOSIS — E55.9 VITAMIN D DEFICIENCY: ICD-10-CM

## 2025-08-01 DIAGNOSIS — E78.5 HYPERLIPIDEMIA, UNSPECIFIED HYPERLIPIDEMIA TYPE: ICD-10-CM

## 2025-08-01 DIAGNOSIS — M10.9 GOUT, UNSPECIFIED CAUSE, UNSPECIFIED CHRONICITY, UNSPECIFIED SITE: ICD-10-CM

## 2025-08-01 DIAGNOSIS — E03.9 HYPOTHYROIDISM, UNSPECIFIED TYPE: ICD-10-CM

## 2025-08-01 DIAGNOSIS — N18.30 STAGE 3 CHRONIC KIDNEY DISEASE, UNSPECIFIED WHETHER STAGE 3A OR 3B CKD (MULTI): ICD-10-CM

## 2025-08-01 DIAGNOSIS — I10 HYPERTENSION, UNSPECIFIED TYPE: ICD-10-CM

## 2025-08-01 DIAGNOSIS — Z00.00 ANNUAL PHYSICAL EXAM: Primary | ICD-10-CM

## 2025-08-01 PROCEDURE — 3079F DIAST BP 80-89 MM HG: CPT | Performed by: INTERNAL MEDICINE

## 2025-08-01 PROCEDURE — 3075F SYST BP GE 130 - 139MM HG: CPT | Performed by: INTERNAL MEDICINE

## 2025-08-01 PROCEDURE — 99215 OFFICE O/P EST HI 40 MIN: CPT | Performed by: INTERNAL MEDICINE

## 2025-08-01 PROCEDURE — 1159F MED LIST DOCD IN RCRD: CPT | Performed by: INTERNAL MEDICINE

## 2025-08-01 PROCEDURE — 1126F AMNT PAIN NOTED NONE PRSNT: CPT | Performed by: INTERNAL MEDICINE

## 2025-08-01 PROCEDURE — G0439 PPPS, SUBSEQ VISIT: HCPCS | Performed by: INTERNAL MEDICINE

## 2025-08-01 RX ORDER — IPRATROPIUM BROMIDE 42 UG/1
2 SPRAY, METERED NASAL AS NEEDED
COMMUNITY

## 2025-08-01 ASSESSMENT — LIFESTYLE VARIABLES
HOW OFTEN DURING THE LAST YEAR HAVE YOU FAILED TO DO WHAT WAS NORMALLY EXPECTED FROM YOU BECAUSE OF DRINKING: NEVER
HOW OFTEN DO YOU HAVE SIX OR MORE DRINKS ON ONE OCCASION: NEVER
HOW OFTEN DURING THE LAST YEAR HAVE YOU NEEDED AN ALCOHOLIC DRINK FIRST THING IN THE MORNING TO GET YOURSELF GOING AFTER A NIGHT OF HEAVY DRINKING: NEVER
HAVE YOU OR SOMEONE ELSE BEEN INJURED AS A RESULT OF YOUR DRINKING: NO
AUDIT TOTAL SCORE: 0
HAS A RELATIVE, FRIEND, DOCTOR, OR ANOTHER HEALTH PROFESSIONAL EXPRESSED CONCERN ABOUT YOUR DRINKING OR SUGGESTED YOU CUT DOWN: NO
SKIP TO QUESTIONS 9-10: 1
HOW OFTEN DURING THE LAST YEAR HAVE YOU HAD A FEELING OF GUILT OR REMORSE AFTER DRINKING: NEVER
AUDIT-C TOTAL SCORE: 0
HOW OFTEN DURING THE LAST YEAR HAVE YOU FOUND THAT YOU WERE NOT ABLE TO STOP DRINKING ONCE YOU HAD STARTED: NEVER
HOW OFTEN DO YOU HAVE A DRINK CONTAINING ALCOHOL: NEVER
HOW MANY STANDARD DRINKS CONTAINING ALCOHOL DO YOU HAVE ON A TYPICAL DAY: PATIENT DOES NOT DRINK
HOW OFTEN DURING THE LAST YEAR HAVE YOU BEEN UNABLE TO REMEMBER WHAT HAPPENED THE NIGHT BEFORE BECAUSE YOU HAD BEEN DRINKING: NEVER

## 2025-08-01 ASSESSMENT — PAIN SCALES - GENERAL: PAINLEVEL_OUTOF10: 0-NO PAIN

## 2025-08-01 ASSESSMENT — ENCOUNTER SYMPTOMS
OCCASIONAL FEELINGS OF UNSTEADINESS: 0
DEPRESSION: 0
LOSS OF SENSATION IN FEET: 0

## 2025-08-02 ENCOUNTER — CLINICAL SUPPORT (OUTPATIENT)
Dept: EMERGENCY MEDICINE | Facility: HOSPITAL | Age: 85
End: 2025-08-02
Payer: MEDICARE

## 2025-08-02 ENCOUNTER — APPOINTMENT (OUTPATIENT)
Dept: RADIOLOGY | Facility: HOSPITAL | Age: 85
End: 2025-08-02
Payer: MEDICARE

## 2025-08-02 ENCOUNTER — HOSPITAL ENCOUNTER (EMERGENCY)
Facility: HOSPITAL | Age: 85
Discharge: LEFT BEFORE DISCHARGE | End: 2025-08-02
Attending: EMERGENCY MEDICINE
Payer: MEDICARE

## 2025-08-02 VITALS
HEIGHT: 63 IN | TEMPERATURE: 96.1 F | RESPIRATION RATE: 16 BRPM | OXYGEN SATURATION: 97 % | WEIGHT: 163 LBS | DIASTOLIC BLOOD PRESSURE: 72 MMHG | SYSTOLIC BLOOD PRESSURE: 164 MMHG | HEART RATE: 94 BPM | BODY MASS INDEX: 28.88 KG/M2

## 2025-08-02 DIAGNOSIS — R55 SYNCOPE, UNSPECIFIED SYNCOPE TYPE: Primary | ICD-10-CM

## 2025-08-02 LAB
ALBUMIN SERPL BCP-MCNC: 4.3 G/DL (ref 3.4–5)
ALP SERPL-CCNC: 77 U/L (ref 33–136)
ALT SERPL W P-5'-P-CCNC: 11 U/L (ref 7–45)
ANION GAP BLDV CALCULATED.4IONS-SCNC: 12 MMOL/L (ref 10–25)
ANION GAP SERPL CALC-SCNC: 16 MMOL/L (ref 10–20)
AST SERPL W P-5'-P-CCNC: 21 U/L (ref 9–39)
BASE EXCESS BLDV CALC-SCNC: 2.4 MMOL/L (ref -2–3)
BASOPHILS # BLD AUTO: 0.09 X10*3/UL (ref 0–0.1)
BASOPHILS NFR BLD AUTO: 0.5 %
BILIRUB SERPL-MCNC: 0.9 MG/DL (ref 0–1.2)
BNP SERPL-MCNC: 98 PG/ML (ref 0–99)
BODY TEMPERATURE: 37 DEGREES CELSIUS
BUN SERPL-MCNC: 23 MG/DL (ref 6–23)
CA-I BLDV-SCNC: 1.27 MMOL/L (ref 1.1–1.33)
CALCIUM SERPL-MCNC: 10.3 MG/DL (ref 8.6–10.6)
CARDIAC TROPONIN I PNL SERPL HS: 4 NG/L (ref 0–34)
CARDIAC TROPONIN I PNL SERPL HS: 4 NG/L (ref 0–34)
CHLORIDE BLDV-SCNC: 102 MMOL/L (ref 98–107)
CHLORIDE SERPL-SCNC: 103 MMOL/L (ref 98–107)
CO2 SERPL-SCNC: 25 MMOL/L (ref 21–32)
CREAT SERPL-MCNC: 1.4 MG/DL (ref 0.5–1.05)
EGFRCR SERPLBLD CKD-EPI 2021: 37 ML/MIN/1.73M*2
EOSINOPHIL # BLD AUTO: 0.27 X10*3/UL (ref 0–0.4)
EOSINOPHIL NFR BLD AUTO: 1.5 %
ERYTHROCYTE [DISTWIDTH] IN BLOOD BY AUTOMATED COUNT: 13.2 % (ref 11.5–14.5)
GLUCOSE BLDV-MCNC: 132 MG/DL (ref 74–99)
GLUCOSE SERPL-MCNC: 125 MG/DL (ref 74–99)
HCO3 BLDV-SCNC: 27.2 MMOL/L (ref 22–26)
HCT VFR BLD AUTO: 43.5 % (ref 36–46)
HCT VFR BLD EST: 45 % (ref 36–46)
HGB BLD-MCNC: 14.3 G/DL (ref 12–16)
HGB BLDV-MCNC: 15 G/DL (ref 12–16)
IMM GRANULOCYTES # BLD AUTO: 0.1 X10*3/UL (ref 0–0.5)
IMM GRANULOCYTES NFR BLD AUTO: 0.5 % (ref 0–0.9)
INHALED O2 CONCENTRATION: 21 %
LACTATE BLDV-SCNC: 1.9 MMOL/L (ref 0.4–2)
LYMPHOCYTES # BLD AUTO: 1.47 X10*3/UL (ref 0.8–3)
LYMPHOCYTES NFR BLD AUTO: 8 %
MAGNESIUM SERPL-MCNC: 2.14 MG/DL (ref 1.6–2.4)
MCH RBC QN AUTO: 28.9 PG (ref 26–34)
MCHC RBC AUTO-ENTMCNC: 32.9 G/DL (ref 32–36)
MCV RBC AUTO: 88 FL (ref 80–100)
MONOCYTES # BLD AUTO: 0.71 X10*3/UL (ref 0.05–0.8)
MONOCYTES NFR BLD AUTO: 3.9 %
NEUTROPHILS # BLD AUTO: 15.69 X10*3/UL (ref 1.6–5.5)
NEUTROPHILS NFR BLD AUTO: 85.6 %
NRBC BLD-RTO: 0 /100 WBCS (ref 0–0)
OXYHGB MFR BLDV: 26.7 % (ref 45–75)
PCO2 BLDV: 42 MM HG (ref 41–51)
PH BLDV: 7.42 PH (ref 7.33–7.43)
PLATELET # BLD AUTO: 356 X10*3/UL (ref 150–450)
PO2 BLDV: 22 MM HG (ref 35–45)
POTASSIUM BLDV-SCNC: 5.4 MMOL/L (ref 3.5–5.3)
POTASSIUM SERPL-SCNC: 5.1 MMOL/L (ref 3.5–5.3)
PROT SERPL-MCNC: 7.3 G/DL (ref 6.4–8.2)
RBC # BLD AUTO: 4.95 X10*6/UL (ref 4–5.2)
SAO2 % BLDV: 27 % (ref 45–75)
SODIUM BLDV-SCNC: 136 MMOL/L (ref 136–145)
SODIUM SERPL-SCNC: 139 MMOL/L (ref 136–145)
WBC # BLD AUTO: 18.3 X10*3/UL (ref 4.4–11.3)

## 2025-08-02 PROCEDURE — 93010 ELECTROCARDIOGRAM REPORT: CPT | Performed by: EMERGENCY MEDICINE

## 2025-08-02 PROCEDURE — 99291 CRITICAL CARE FIRST HOUR: CPT | Performed by: EMERGENCY MEDICINE

## 2025-08-02 PROCEDURE — 84132 ASSAY OF SERUM POTASSIUM: CPT | Performed by: EMERGENCY MEDICINE

## 2025-08-02 PROCEDURE — 36415 COLL VENOUS BLD VENIPUNCTURE: CPT | Performed by: EMERGENCY MEDICINE

## 2025-08-02 PROCEDURE — 2500000004 HC RX 250 GENERAL PHARMACY W/ HCPCS (ALT 636 FOR OP/ED): Performed by: EMERGENCY MEDICINE

## 2025-08-02 PROCEDURE — 96360 HYDRATION IV INFUSION INIT: CPT

## 2025-08-02 PROCEDURE — 87040 BLOOD CULTURE FOR BACTERIA: CPT

## 2025-08-02 PROCEDURE — 84484 ASSAY OF TROPONIN QUANT: CPT | Performed by: EMERGENCY MEDICINE

## 2025-08-02 PROCEDURE — 85025 COMPLETE CBC W/AUTO DIFF WBC: CPT | Performed by: EMERGENCY MEDICINE

## 2025-08-02 PROCEDURE — 83880 ASSAY OF NATRIURETIC PEPTIDE: CPT | Performed by: EMERGENCY MEDICINE

## 2025-08-02 PROCEDURE — 99284 EMERGENCY DEPT VISIT MOD MDM: CPT | Mod: 25 | Performed by: EMERGENCY MEDICINE

## 2025-08-02 PROCEDURE — 93005 ELECTROCARDIOGRAM TRACING: CPT

## 2025-08-02 PROCEDURE — 83735 ASSAY OF MAGNESIUM: CPT | Performed by: EMERGENCY MEDICINE

## 2025-08-02 RX ADMIN — SODIUM CHLORIDE, POTASSIUM CHLORIDE, SODIUM LACTATE AND CALCIUM CHLORIDE 1000 ML: 600; 310; 30; 20 INJECTION, SOLUTION INTRAVENOUS at 17:47

## 2025-08-02 ASSESSMENT — PAIN - FUNCTIONAL ASSESSMENT: PAIN_FUNCTIONAL_ASSESSMENT: 0-10

## 2025-08-02 ASSESSMENT — PAIN SCALES - GENERAL: PAINLEVEL_OUTOF10: 0 - NO PAIN

## 2025-08-02 NOTE — ED PROVIDER NOTES
Emergency Department Provider Note        History of Present Illness   Information Gathering: History collected from chart review and patient.    HPI:  Marzena Robb is a 85 y.o. female with PMH significant for provoked PE and diverticulitis presenting to the emergency department from a baseball game following syncopal episodes.  Patient states she was at the guardian's game when she felt hot flushed sweaty and lightheaded.  She states that she laid down on the ground due to feeling like she would pass out and states that she feels like she did pass out.  She states that this happened multiple times back-to-back.  No reported seizure-like events afterwards.  Denies any confusion afterwards.  No chest pain or palpitations during this time.  No prior fevers.  Only other complaint today is that patient states that she does have lower pubic pain.  No recent vaginal bleeding or discharge.  No dysuria.  No nausea or vomiting or chest pain at this time.  No shortness of breath.  States that she does have a history of provoked DVT in the past.  Is not on anticoagulation at this time.    Physical Exam   Triage vitals:  T 35.6 °C (96.1 °F)  HR 84  /79  RR 18  O2 98 % None (Room air)    Physical Exam  Constitutional:       Appearance: Normal appearance.      Comments: Hard of hearing but well-appearing resting comfortably on the bed   HENT:      Head: Normocephalic and atraumatic.     Eyes:      Extraocular Movements: Extraocular movements intact.      Pupils: Pupils are equal, round, and reactive to light.       Cardiovascular:      Rate and Rhythm: Normal rate and regular rhythm.   Pulmonary:      Effort: Pulmonary effort is normal.      Breath sounds: Normal breath sounds.      Comments: Regular respiratory rate saturating well on room air  Abdominal:      General: Abdomen is flat.      Palpations: Abdomen is soft.      Comments: Soft and flat.  Mildly tender to palpation bilateral lower quadrants.  No rebound  tenderness masses organomegaly.  No overlying traumatic skin changes.     Musculoskeletal:         General: No swelling or signs of injury. Normal range of motion.     Neurological:      General: No focal deficit present.      Mental Status: She is alert and oriented to person, place, and time.      Comments: Patient is awake and alert. Speech is clear. Face is symmetric without facial droop and facial sensation to light touch equal bilaterally. Uvula midline. Tongue protrusion midline. Hearing intact bilaterally. Full and equal shoulder shrug and head turn against resistance. 5/5 motor strength of UEs and LEs. Sensation to light touch intact in all four extremities.       Medical Decision Making & ED Course   Medical Decision Makin y.o. female with PMH significant for provoked PE and diverticulitis presenting to the emergency department from a baseball game following syncopal episodes.  On presentation, patient is hemodynamically stable, afebrile and saturating well on room air without signs of increased respiratory effort.  Initial EKG, no evidence of arrhythmia or acute ischemic changes.  No evidence of STEMI.  Stroke considered however she is without focal neurologic deficits.  Point-of-care glucose within normal limits.  PE considered and CT PE ordered however patient is not hypoxic and is does not have chest pain at this time.  Seizure considered however no reported postictal state or myoclonic-like activity.  Infectious and sepsis considered however patient is currently normotensive despite minimal medical interventions.  She is tender to palpation in the lower quadrants of her abdomen with history of diverticulosis so we will obtain CT abdomen pelvis to rule out diverticulitis which could be  of potential infection.  Aortic dissection considered however if patient is without pain once again and history of prodromal symptoms with syncopal event could be explained by vasovagal syncope.  CMP is  without severe electrolyte derangements was does demonstrate a mild ABDIRIZAK with a new creatinine of 1.4 and CBC with a leukocytosis of 18.3.  And sent to oncoming provider, plan is to await CT imaging with a likely admission for syncope workup depending on CT results.    Differential diagnosis includes but is not limited to:   syncope:  arrhythmia, cerebrovascular event, hypoglycemia, pulmonary embolism, seizure, structural heart disease, aortic dissection, hypovolemia, vasovagal syncope      ED Course:  ED Course as of 08/02/25 2105   Sat Aug 02, 2025   1817 ECG 12 lead  ECG interpreted by me.  Performed 8/2/2025 at 1752.  Sinus rhythm sinus arrhythmia 75 bpm.  , QRS 70, QTc 424.  No ST elevation.  No delta wave, LVH, or Brugada. [MC]   1917 Critical activation arrived by EMS.  Reportedly lost consciousness several times including several witnessed by EMS during transport lasting 10 to 15 seconds in duration without seizure activity.  Patient reported lower abdominal pain and diarrhea.  I reviewed discharge summary from 10/11/2023 when patient was admitted for management of diverticulitis.  Patient was reportedly hypotensive during transport but normotensive on arrival.  Exam notable for lower abdominal tenderness.  Labs demonstrated leukocytosis and ABDIRIZAK.  Blood pressure improving with intravenous fluids.  CT of head, cervical spine, angio chest and abdomen pelvis ordered to assess for possible explanations for multiple episodes of loss of consciousness, leukocytosis, abdominal pain. [MC]   1925 Patient received in signout from outgoing provider pending CT results and likely admission for syncope workup otherwise unremarkable. [CL]      ED Course User Index  [CL] Tereso Gonzalez DO  [MC] Efrain Lyons MD       ----  EKG Independent Interpretation: EKG interpreted by myself. Please see ED Course for full interpretation.    Independent Result Review and Interpretation: Relevant laboratory and radiographic results  were reviewed and independently interpreted by myself.  As necessary, they are commented on in the ED Course.    Chronic conditions affecting the patient's care: As documented above in MDM    The patient was discussed with the following consultants/services: As described in MDM      Disposition   And sent to oncoming provider, plan is to await CT imaging with a likely admission for syncope workup depending on CT results.    Procedures   Procedures    Patient seen and discussed with ED attending physician.    Ajit Chaves MD  Emergency Medicine, PGY-3     Devang Chaves MD  Resident  08/02/25 [id number]

## 2025-08-02 NOTE — ED TRIAGE NOTES
Patient BIB from [employer]; per EMS she has had 4 syncopal episodes today, lasting about 10-12 seconds each; patient states she lowered herself to the ground on first episode, and was laying/sitting to begin with for the other ones; denies any head strike or use of anticoagulation; also endorses diarrhea and suprapubic abdominal pain/tenderness on palp; states she was recently diagnosed with a condition that causes this, she cannot currently remember the name of; she is however A&Ox4 on arrival; protecting airway with RA; states she has been feeling generally well lately; EMS had reported hypotension, so patient was activated as a critical patient, /79 on arrival; Records indicate Hx of epiploic appendagitis, HTN, HLD, CKD III, hypothyroidism, Vitamin D deficiency, and gout

## 2025-08-03 LAB
BACTERIA BLD CULT: NORMAL
BACTERIA BLD CULT: NORMAL

## 2025-08-03 NOTE — DISCHARGE INSTRUCTIONS
As we discussed please follow-up with your primary care physician as soon as possible to discuss further workup for abdominal pain and loss of consciousness.  If you change your mind and especially if you develop new or worsening symptoms please return as soon as possible to the nearest emergency department or call 911.

## 2025-08-04 NOTE — ED PROCEDURE NOTE
Procedure  Critical Care    Performed by: Efrain Lyons MD  Authorized by: Efrain Lyons MD    Critical care provider statement:     Critical care time (minutes):  30    Critical care time was exclusive of:  Separately billable procedures and treating other patients and teaching time    Critical care was necessary to treat or prevent imminent or life-threatening deterioration of the following conditions:  Circulatory failure    Critical care was time spent personally by me on the following activities:  Blood draw for specimens, development of treatment plan with patient or surrogate, ordering and performing treatments and interventions, ordering and review of laboratory studies, ordering and review of radiographic studies, pulse oximetry, re-evaluation of patient's condition, review of old charts, examination of patient, obtaining history from patient or surrogate and evaluation of patient's response to treatment               Efrain Lyons MD  08/04/25 [id number]

## 2025-08-05 ENCOUNTER — TELEPHONE (OUTPATIENT)
Dept: PRIMARY CARE | Facility: CLINIC | Age: 85
End: 2025-08-05
Payer: MEDICARE

## 2025-08-05 DIAGNOSIS — R93.5 ABNORMAL CT OF THE ABDOMEN: Primary | ICD-10-CM

## 2025-08-05 LAB
ATRIAL RATE: 75 BPM
P AXIS: 64 DEGREES
P OFFSET: 187 MS
P ONSET: 128 MS
PR INTERVAL: 172 MS
Q ONSET: 214 MS
QRS COUNT: 12 BEATS
QRS DURATION: 70 MS
QT INTERVAL: 380 MS
QTC CALCULATION(BAZETT): 424 MS
QTC FREDERICIA: 409 MS
R AXIS: 1 DEGREES
T AXIS: 64 DEGREES
T OFFSET: 404 MS
VENTRICULAR RATE: 75 BPM

## 2025-08-06 ENCOUNTER — OFFICE VISIT (OUTPATIENT)
Dept: PRIMARY CARE | Facility: CLINIC | Age: 85
End: 2025-08-06
Payer: MEDICARE

## 2025-08-06 VITALS
DIASTOLIC BLOOD PRESSURE: 86 MMHG | SYSTOLIC BLOOD PRESSURE: 140 MMHG | OXYGEN SATURATION: 99 % | BODY MASS INDEX: 28.53 KG/M2 | TEMPERATURE: 96 F | HEIGHT: 63 IN | HEART RATE: 91 BPM | WEIGHT: 161 LBS

## 2025-08-06 DIAGNOSIS — R10.9 ABDOMINAL PAIN, UNSPECIFIED ABDOMINAL LOCATION: Primary | ICD-10-CM

## 2025-08-06 LAB
BACTERIA BLD CULT: NORMAL
BACTERIA BLD CULT: NORMAL

## 2025-08-06 PROCEDURE — 1159F MED LIST DOCD IN RCRD: CPT | Performed by: INTERNAL MEDICINE

## 2025-08-06 PROCEDURE — 1126F AMNT PAIN NOTED NONE PRSNT: CPT | Performed by: INTERNAL MEDICINE

## 2025-08-06 PROCEDURE — 3077F SYST BP >= 140 MM HG: CPT | Performed by: INTERNAL MEDICINE

## 2025-08-06 PROCEDURE — 3079F DIAST BP 80-89 MM HG: CPT | Performed by: INTERNAL MEDICINE

## 2025-08-06 PROCEDURE — 1036F TOBACCO NON-USER: CPT | Performed by: INTERNAL MEDICINE

## 2025-08-06 PROCEDURE — 99214 OFFICE O/P EST MOD 30 MIN: CPT | Performed by: INTERNAL MEDICINE

## 2025-08-06 ASSESSMENT — LIFESTYLE VARIABLES
HOW OFTEN DO YOU HAVE A DRINK CONTAINING ALCOHOL: NEVER
HOW OFTEN DURING THE LAST YEAR HAVE YOU FAILED TO DO WHAT WAS NORMALLY EXPECTED FROM YOU BECAUSE OF DRINKING: NEVER
AUDIT-C TOTAL SCORE: 0
SKIP TO QUESTIONS 9-10: 1
HOW OFTEN DURING THE LAST YEAR HAVE YOU BEEN UNABLE TO REMEMBER WHAT HAPPENED THE NIGHT BEFORE BECAUSE YOU HAD BEEN DRINKING: NEVER
HOW OFTEN DURING THE LAST YEAR HAVE YOU FOUND THAT YOU WERE NOT ABLE TO STOP DRINKING ONCE YOU HAD STARTED: NEVER
HOW MANY STANDARD DRINKS CONTAINING ALCOHOL DO YOU HAVE ON A TYPICAL DAY: PATIENT DOES NOT DRINK
AUDIT TOTAL SCORE: 0
HOW OFTEN DO YOU HAVE SIX OR MORE DRINKS ON ONE OCCASION: NEVER
HAS A RELATIVE, FRIEND, DOCTOR, OR ANOTHER HEALTH PROFESSIONAL EXPRESSED CONCERN ABOUT YOUR DRINKING OR SUGGESTED YOU CUT DOWN: NO
HOW OFTEN DURING THE LAST YEAR HAVE YOU NEEDED AN ALCOHOLIC DRINK FIRST THING IN THE MORNING TO GET YOURSELF GOING AFTER A NIGHT OF HEAVY DRINKING: NEVER
HOW OFTEN DURING THE LAST YEAR HAVE YOU HAD A FEELING OF GUILT OR REMORSE AFTER DRINKING: NEVER
HAVE YOU OR SOMEONE ELSE BEEN INJURED AS A RESULT OF YOUR DRINKING: NO

## 2025-08-06 ASSESSMENT — ENCOUNTER SYMPTOMS
COUGH: 0
DIARRHEA: 0
OCCASIONAL FEELINGS OF UNSTEADINESS: 0
LOSS OF SENSATION IN FEET: 0
FEVER: 0
VOMITING: 0
ABDOMINAL PAIN: 0
SHORTNESS OF BREATH: 0
HEADACHES: 0
DEPRESSION: 0
CHILLS: 0
APPETITE CHANGE: 0
NAUSEA: 0

## 2025-08-06 ASSESSMENT — PATIENT HEALTH QUESTIONNAIRE - PHQ9
SUM OF ALL RESPONSES TO PHQ9 QUESTIONS 1 AND 2: 0
1. LITTLE INTEREST OR PLEASURE IN DOING THINGS: NOT AT ALL
2. FEELING DOWN, DEPRESSED OR HOPELESS: NOT AT ALL

## 2025-08-06 ASSESSMENT — PAIN SCALES - GENERAL: PAINLEVEL_OUTOF10: 0-NO PAIN

## 2025-08-06 NOTE — PROGRESS NOTES
[facility]: [facility]  PROGRESS NOTE      Marzena Robb is a 85 y.o. female that is presenting today for syncope and diarrhea.    Assessment/Plan   Diagnoses and all orders for this visit:  Abdominal pain, unspecified abdominal location    Recurrent bouts of abdominal pain with diarrhea accompanied by presyncopal episodes which could be vagal in nature.  Unclear etiology, infectious and inflammatory causes could be considered as well as ischemic colitis.  The last four CTs are reviewed and there has been thickening of the colon wall observed as well as a vague density, possible of intraluminal lesions.  To this point she has been very reluctant to have the colonoscopy done but she says she is willing to do so.  I would like to ask general surgery for opinion on the imaging and her presentation and to consider colonoscopy.  She is agreeable.  Referral placed in that regard    Subjective   HPI  This is an 85-year-old woman that has had recurrent episodes of abdominal pain with severe diarrhea.  She becomes lightheaded from a syncopal.  This happened at a baseball game recently and she was taken by ambulance to the hospital where she was found with leukocytosis and appeared dehydrated.  Lactate was not drawn.  CT of the abdomen and pelvis with a wall prominence of the distal transverse colon and some type of density inferior to the gallbladder.  Prior CTs of also show nonspecific findings although intraluminal colon lesion has not been excluded.  She has been very very reluctant to to have a colonoscopy.  Currently she says she feels fine and is back to normal.  These episodes are extremely transient.  Notably, the blood pressure was very low when she was evaluated by paramedics at the baseball game with a systolic reading in the 70s.    Review of Systems   Constitutional:  Negative for appetite change, chills and fever.   Respiratory:  Negative for cough and shortness of breath.     Cardiovascular:  Negative for chest pain.   Gastrointestinal:  Negative for abdominal pain, diarrhea, nausea and vomiting.   Neurological:  Negative for headaches.   All other systems reviewed and are negative.     Objective   Vitals:    08/06/25 1500   BP: 140/86   Pulse:    Temp:    SpO2:       Body mass index is 28.52 kg/m².  Physical Exam  Vitals reviewed.   Constitutional:       General: She is not in acute distress.     Appearance: Normal appearance. She is not toxic-appearing.   HENT:      Head: Normocephalic and atraumatic.      Mouth/Throat:      Mouth: Mucous membranes are moist.     Eyes:      Pupils: Pupils are equal, round, and reactive to light.       Cardiovascular:      Rate and Rhythm: Normal rate and regular rhythm.      Heart sounds: No murmur heard.  Pulmonary:      Breath sounds: Normal breath sounds. No wheezing, rhonchi or rales.   Abdominal:      General: Bowel sounds are normal. There is no distension.      Palpations: Abdomen is soft.     Musculoskeletal:      Right lower leg: No edema.      Left lower leg: No edema.     Neurological:      General: No focal deficit present.      Mental Status: She is alert and oriented to person, place, and time.       Diagnostic Results   Lab Results   Component Value Date    GLUCOSE 125 (H) 08/02/2025    CALCIUM 10.3 08/02/2025     08/02/2025    K 5.1 08/02/2025    CO2 25 08/02/2025     08/02/2025    BUN 23 08/02/2025    CREATININE 1.40 (H) 08/02/2025     Lab Results   Component Value Date    ALT 11 08/02/2025    AST 21 08/02/2025    ALKPHOS 77 08/02/2025    BILITOT 0.9 08/02/2025     Lab Results   Component Value Date    WBC 18.3 (H) 08/02/2025    HGB 14.3 08/02/2025    HCT 43.5 08/02/2025    MCV 88 08/02/2025     08/02/2025     Lab Results   Component Value Date    CHOL 200 (H) 07/28/2025    CHOL 204 (H) 07/23/2024    CHOL 213 (H) 07/25/2023     Lab Results   Component Value Date    HDL 60 07/28/2025    HDL 62.0 07/23/2024    HDL 62  "07/25/2023     Lab Results   Component Value Date    LDLCALC 115 (H) 07/28/2025    LDLCALC 124 07/23/2024    LDLCALC 129 07/25/2023     Lab Results   Component Value Date    TRIG 131 07/28/2025    TRIG 91 07/23/2024    TRIG 112 07/25/2023     No components found for: \"CHOLHDL\"  Lab Results   Component Value Date    HGBA1C 5.7 (H) 07/28/2025     Other labs not included in the list above were reviewed either before or during this encounter.    History    Medical History[1]  Surgical History[2]  Family History[3]  Social History     Socioeconomic History    Marital status:      Spouse name: Not on file    Number of children: Not on file    Years of education: Not on file    Highest education level: Not on file   Occupational History    Not on file   Tobacco Use    Smoking status: Never    Smokeless tobacco: Never   Vaping Use    Vaping status: Never Used   Substance and Sexual Activity    Alcohol use: Not Currently     Comment: Social use    Drug use: Never    Sexual activity: Not Currently   Other Topics Concern    Not on file   Social History Narrative    Not on file     Social Drivers of Health     Financial Resource Strain: Low Risk  (10/9/2023)    Overall Financial Resource Strain (CARDIA)     Difficulty of Paying Living Expenses: Not hard at all   Food Insecurity: No Food Insecurity (10/9/2023)    Hunger Vital Sign     Worried About Running Out of Food in the Last Year: Never true     Ran Out of Food in the Last Year: Never true   Transportation Needs: No Transportation Needs (10/9/2023)    PRAPARE - Transportation     Lack of Transportation (Medical): No     Lack of Transportation (Non-Medical): No   Physical Activity: Inactive (10/9/2023)    Exercise Vital Sign     Days of Exercise per Week: 0 days     Minutes of Exercise per Session: 0 min   Stress: No Stress Concern Present (10/9/2023)    [employer] - Occupational Stress Questionnaire     Feeling of Stress : Not at all "   Social Connections: Unknown (10/9/2023)    Social Connection and Isolation Panel     Frequency of Communication with Friends and Family: More than three times a week     Frequency of Social Gatherings with Friends and Family: Three times a week     Attends Druze Services: Never     Active Member of Clubs or Organizations: No     Attends Club or Organization Meetings: Never     Marital Status: Not on file   Intimate Partner Violence: Not At Risk (10/9/2023)    Humiliation, Afraid, Rape, and Kick questionnaire     Fear of Current or Ex-Partner: No     Emotionally Abused: No     Physically Abused: No     Sexually Abused: No   Housing Stability: Low Risk  (10/9/2023)    Housing Stability Vital Sign     Unable to Pay for Housing in the Last Year: No     Number of Places Lived in the Last Year: 1     Unstable Housing in the Last Year: No     Allergies[4]  Medications Ordered Prior to Encounter[5]  Immunization History   Administered Date(s) Administered    COVID-19, mRNA, LNP-S, PF, 30 mcg/0.3 mL dose 02/07/2021, 02/28/2021, 09/29/2021    Flu vaccine, quadrivalent, high-dose, preservative free, age 65y+ (FLUZONE) 01/15/2021, 01/26/2023    Flu vaccine, trivalent, preservative free, HIGH-DOSE, age 65y+ (Fluzone) 12/27/2019, 01/30/2025    Pfizer COVID-19 vaccine, 12 years and older, (30mcg/0.3mL) (Comirnaty) 12/08/2023    Pfizer COVID-19 vaccine, bivalent, age 12 years and older (30 mcg/0.3 mL) 11/10/2022    Pfizer Gray Cap SARS-CoV-2 04/22/2022    Pneumococcal conjugate vaccine, 20-valent (PREVNAR 20) 01/18/2024    Tdap vaccine, age 7 year and older (BOOSTRIX, ADACEL) 11/16/2016     Patient's medical history was reviewed and updated either before or during this encounter.       Kirk Miller MD       [1]   Past Medical History:  Diagnosis Date    Arthritis     Disease of thyroid gland     Diverticulitis 10/09/2023    HL (hearing loss)     Hypertension     Osteoarthritis 09/06/2023    Stage 3 chronic kidney  disease (Multi) 10/18/2023   [2]   Past Surgical History:  Procedure Laterality Date    FRACTURE SURGERY      TONSILLECTOMY      WISDOM TOOTH EXTRACTION     [3]   Family History  Problem Relation Name Age of Onset    No Known Problems Mother Gabriel     Cancer Father Pastor     Colon cancer Father Pastor    [4]   Allergies  Allergen Reactions    Codeine Other     Vasovagal response    Hydromorphone Other     Vasovagals response    Ciprofloxacin Other     tendonitis   [5]   Current Outpatient Medications on File Prior to Visit   Medication Sig Dispense Refill    allopurinol (Zyloprim) 100 mg tablet TAKE ONE TABLET BY MOUTH ONCE A DAY 90 tablet 3    ipratropium (Atrovent) 42 mcg (0.06 %) nasal spray Administer 2 sprays into each nostril if needed for rhinitis.      levothyroxine (Synthroid, Levoxyl) 88 mcg tablet Take 1 tablet (88 mcg) by mouth early in the morning.. Take on an empty stomach at the same time each day, either 30 to 60 minutes prior to breakfast 90 tablet 3    lisinopril 10 mg tablet TAKE ONE TABLET BY MOUTH EVERY DAY 90 tablet 3    pravastatin (Pravachol) 20 mg tablet take one tablet by mouth once a day 90 tablet 3    [DISCONTINUED] ipratropium (Atrovent) 42 mcg (0.06 %) nasal spray Administer 2 sprays into each nostril 4 times a day. (Patient not taking: Reported on 1/30/2025) 15 mL 0    [DISCONTINUED] traZODone (Desyrel) 50 mg tablet Take 1 tablet (50 mg) by mouth as needed at bedtime for sleep. (Patient not taking: Reported on 8/1/2025) 30 tablet 5     No current facility-administered medications on file prior to visit.

## 2025-09-05 ENCOUNTER — APPOINTMENT (OUTPATIENT)
Dept: SURGERY | Facility: CLINIC | Age: 85
End: 2025-09-05
Payer: MEDICARE